# Patient Record
Sex: FEMALE | Race: OTHER | HISPANIC OR LATINO | Employment: OTHER | URBAN - METROPOLITAN AREA
[De-identification: names, ages, dates, MRNs, and addresses within clinical notes are randomized per-mention and may not be internally consistent; named-entity substitution may affect disease eponyms.]

---

## 2022-03-14 NOTE — PROGRESS NOTES
"Subjective:       Patient ID: Rajendra Blackmon is a 46 y.o. female.    Chief Complaint: Establish Care    Umberto - Romanian Translation Line Used     Rajendra Blackmon is a 46 y.o. female who presents today to establish care.     Reports weight gain, depression, and fatigue since having implant placed by [facility] (daughters of luiz). Is interested in removal.   Recently  (x 1 year) did educate on other options for contraceptive. Will refer to OBGYN.     Reports intermittently having nausea, dizziness, feeling "faint" like she is "not getting enough oxygen".     Reports "allergies" that causes rashes. She is a  and feels things like "dust" contribute.     Past Medical History:   Diagnosis Date    Allergy      History reviewed. No pertinent surgical history.  Social History     Socioeconomic History    Marital status:    Tobacco Use    Smoking status: Never Smoker    Smokeless tobacco: Never Used   Substance and Sexual Activity    Alcohol use: Never    Drug use: Never    Sexual activity: Yes     Partners: Male     Birth control/protection: Implant     Family History   Problem Relation Age of Onset    Cancer Sister         uterine    Cancer Brother         skin         Health Maintenance    MM2020?  PAP: ?  Colonoscopy: Declined   Hepatitis C Screen: Declined  HIV Screen: Declined       How would you described your general health: Good    Patient ambulates on her own without an assistive device.     On average, how many days per week do you do moderate to strenuous exercise:  (like a brisk walk or jog; this does not include your job/work)  1-3     On average, how many minutes do you exercise at this level each day? 30    Do you eat fruits and vegetable every day?  Often, but not daily    Do you have any questions or concerns about your eating habits?  No    Do you have any concerns in regards to access to food? No    Have you ever been a victim of threats, physical hurting, or forced " "sexual contact?  No    Does you partner control where you go or make you feel afraid? No    Have you ever had a partner who physically hurt or threatened you? No    Have you ever used tobacco products? No    Have you ever been screened for HIV? No  Would you like to be screened for HIV? No    If female and sex with a male partner, do either of you use protection against pregnancy?  Yes    If yes, what kind of protection    Other  Do you still have a menstrual cycle? No       If not,  contraception that interferes with cycles    Do you go to the dentist regularly? Yes  When was you last exam:     Do you go to the eye doctor regularly? No  When was your last exam:    Do you have any personal or family history of breast/ovarian/colon cancer?  Yes       If yes, who?    Do you have any personal or family history of heart disease? No       If yes, who?    Have you often been bothered by feeling down, depressed, or hopeless?  nearly every day    Have you often been bothered by having little interest or pleasure in doing things?  nearly every day    How often do you drink alcohol?  Never    How often have you used Marijuana or other illicit drugs in the past year?  never    How often have you used prescribed controlled substances in the past year?  never      Review of patient's allergies indicates:  No Known Allergies    Review of Systems   Constitutional: Positive for malaise/fatigue.   Respiratory: Positive for shortness of breath.    Cardiovascular: Negative for chest pain.   Gastrointestinal: Positive for abdominal pain and nausea. Negative for constipation, diarrhea and vomiting.   Skin: Positive for rash.   Neurological: Positive for dizziness.   Psychiatric/Behavioral: Positive for depression. The patient is nervous/anxious and has insomnia.        Objective:   /80 (BP Location: Right arm, Patient Position: Sitting, BP Method: Medium (Manual))   Pulse 62   Temp 97 °F (36.1 °C) (Temporal)   Ht 5' 1" (1.549 " m)   Wt 64.2 kg (141 lb 8.6 oz)   SpO2 98%   BMI 26.74 kg/m²     Physical Exam  Vitals reviewed.   Constitutional:       Appearance: Normal appearance.   HENT:      Head: Normocephalic and atraumatic.   Cardiovascular:      Rate and Rhythm: Normal rate and regular rhythm.      Heart sounds: Normal heart sounds. No murmur heard.  Pulmonary:      Effort: Pulmonary effort is normal.      Breath sounds: Normal breath sounds. No wheezing.   Abdominal:      General: Bowel sounds are normal.      Palpations: Abdomen is soft.      Tenderness: There is abdominal tenderness in the epigastric area.   Skin:     General: Skin is warm and dry.      Findings: Rash present. Rash is urticarial (to left AC).   Neurological:      Mental Status: She is alert and oriented to person, place, and time.       Assessment and Plan:       1. Encounter for annual physical exam    - CBC Auto Differential; Future  - Comprehensive Metabolic Panel; Future  - Lipid Panel; Future  - TSH; Future  - Ambulatory referral/consult to Obstetrics / Gynecology; Future  - SCHEDULED EKG 12-LEAD (to Muse); Future    2. Epigastric pain    New, work up, ? Medication     - H. PYLORI ANTIBODY, IGG; Future    3. Allergy, initial encounter    Chronic/recurrent, not stable, trial medications     - triamcinolone acetonide 0.1% (KENALOG) 0.1 % ointment; Apply topically 2 (two) times daily.  Dispense: 30 g; Refill: 0  - montelukast (SINGULAIR) 10 mg tablet; Take 1 tablet (10 mg total) by mouth every evening.  Dispense: 30 tablet; Refill: 1    4. Encounter for screening mammogram for malignant neoplasm of breast    - Mammo Digital Screening Bilat w/ Jacob; Future    5. Encounter for removal of subdermal contraceptive implant    - Ambulatory referral/consult to Obstetrics / Gynecology; Future    Labs Today - RTC in 1 week

## 2022-03-16 ENCOUNTER — OFFICE VISIT (OUTPATIENT)
Dept: INTERNAL MEDICINE | Facility: CLINIC | Age: 47
End: 2022-03-16
Payer: COMMERCIAL

## 2022-03-16 ENCOUNTER — LAB VISIT (OUTPATIENT)
Dept: LAB | Facility: HOSPITAL | Age: 47
End: 2022-03-16
Attending: NURSE PRACTITIONER
Payer: COMMERCIAL

## 2022-03-16 VITALS
BODY MASS INDEX: 26.73 KG/M2 | HEART RATE: 62 BPM | DIASTOLIC BLOOD PRESSURE: 80 MMHG | TEMPERATURE: 97 F | WEIGHT: 141.56 LBS | OXYGEN SATURATION: 98 % | SYSTOLIC BLOOD PRESSURE: 100 MMHG | HEIGHT: 61 IN

## 2022-03-16 DIAGNOSIS — Z00.00 ENCOUNTER FOR ANNUAL PHYSICAL EXAM: Primary | ICD-10-CM

## 2022-03-16 DIAGNOSIS — Z12.31 ENCOUNTER FOR SCREENING MAMMOGRAM FOR MALIGNANT NEOPLASM OF BREAST: ICD-10-CM

## 2022-03-16 DIAGNOSIS — R10.13 EPIGASTRIC PAIN: ICD-10-CM

## 2022-03-16 DIAGNOSIS — Z30.46 ENCOUNTER FOR REMOVAL OF SUBDERMAL CONTRACEPTIVE IMPLANT: ICD-10-CM

## 2022-03-16 DIAGNOSIS — Z00.00 ENCOUNTER FOR ANNUAL PHYSICAL EXAM: ICD-10-CM

## 2022-03-16 DIAGNOSIS — T78.40XA ALLERGY, INITIAL ENCOUNTER: ICD-10-CM

## 2022-03-16 LAB
ALBUMIN SERPL BCP-MCNC: 4.2 G/DL (ref 3.5–5.2)
ALP SERPL-CCNC: 93 U/L (ref 55–135)
ALT SERPL W/O P-5'-P-CCNC: 25 U/L (ref 10–44)
ANION GAP SERPL CALC-SCNC: 12 MMOL/L (ref 8–16)
AST SERPL-CCNC: 21 U/L (ref 10–40)
BASOPHILS # BLD AUTO: 0.05 K/UL (ref 0–0.2)
BASOPHILS NFR BLD: 0.8 % (ref 0–1.9)
BILIRUB SERPL-MCNC: 0.3 MG/DL (ref 0.1–1)
BUN SERPL-MCNC: 13 MG/DL (ref 6–20)
CALCIUM SERPL-MCNC: 10 MG/DL (ref 8.7–10.5)
CHLORIDE SERPL-SCNC: 107 MMOL/L (ref 95–110)
CHOLEST SERPL-MCNC: 194 MG/DL (ref 120–199)
CHOLEST/HDLC SERPL: 5.4 {RATIO} (ref 2–5)
CO2 SERPL-SCNC: 21 MMOL/L (ref 23–29)
CREAT SERPL-MCNC: 0.8 MG/DL (ref 0.5–1.4)
DIFFERENTIAL METHOD: ABNORMAL
EOSINOPHIL # BLD AUTO: 0.6 K/UL (ref 0–0.5)
EOSINOPHIL NFR BLD: 9.5 % (ref 0–8)
ERYTHROCYTE [DISTWIDTH] IN BLOOD BY AUTOMATED COUNT: 12.4 % (ref 11.5–14.5)
EST. GFR  (AFRICAN AMERICAN): >60 ML/MIN/1.73 M^2
EST. GFR  (NON AFRICAN AMERICAN): >60 ML/MIN/1.73 M^2
GLUCOSE SERPL-MCNC: 90 MG/DL (ref 70–110)
HCT VFR BLD AUTO: 41.6 % (ref 37–48.5)
HDLC SERPL-MCNC: 36 MG/DL (ref 40–75)
HDLC SERPL: 18.6 % (ref 20–50)
HGB BLD-MCNC: 13.9 G/DL (ref 12–16)
IMM GRANULOCYTES # BLD AUTO: 0.01 K/UL (ref 0–0.04)
IMM GRANULOCYTES NFR BLD AUTO: 0.2 % (ref 0–0.5)
LDLC SERPL CALC-MCNC: 105.2 MG/DL (ref 63–159)
LYMPHOCYTES # BLD AUTO: 2.3 K/UL (ref 1–4.8)
LYMPHOCYTES NFR BLD: 36.3 % (ref 18–48)
MCH RBC QN AUTO: 29 PG (ref 27–31)
MCHC RBC AUTO-ENTMCNC: 33.4 G/DL (ref 32–36)
MCV RBC AUTO: 87 FL (ref 82–98)
MONOCYTES # BLD AUTO: 0.3 K/UL (ref 0.3–1)
MONOCYTES NFR BLD: 5.1 % (ref 4–15)
NEUTROPHILS # BLD AUTO: 3.1 K/UL (ref 1.8–7.7)
NEUTROPHILS NFR BLD: 48.1 % (ref 38–73)
NONHDLC SERPL-MCNC: 158 MG/DL
NRBC BLD-RTO: 0 /100 WBC
PLATELET # BLD AUTO: 291 K/UL (ref 150–450)
PMV BLD AUTO: 9.5 FL (ref 9.2–12.9)
POTASSIUM SERPL-SCNC: 4 MMOL/L (ref 3.5–5.1)
PROT SERPL-MCNC: 8.1 G/DL (ref 6–8.4)
RBC # BLD AUTO: 4.79 M/UL (ref 4–5.4)
SODIUM SERPL-SCNC: 140 MMOL/L (ref 136–145)
TRIGL SERPL-MCNC: 264 MG/DL (ref 30–150)
TSH SERPL DL<=0.005 MIU/L-ACNC: 2.31 UIU/ML (ref 0.4–4)
WBC # BLD AUTO: 6.42 K/UL (ref 3.9–12.7)

## 2022-03-16 PROCEDURE — 3008F PR BODY MASS INDEX (BMI) DOCUMENTED: ICD-10-PCS | Mod: CPTII,S$GLB,, | Performed by: NURSE PRACTITIONER

## 2022-03-16 PROCEDURE — 99386 PR PREVENTIVE VISIT,NEW,40-64: ICD-10-PCS | Mod: S$GLB,,, | Performed by: NURSE PRACTITIONER

## 2022-03-16 PROCEDURE — 84443 ASSAY THYROID STIM HORMONE: CPT | Performed by: NURSE PRACTITIONER

## 2022-03-16 PROCEDURE — 80053 COMPREHEN METABOLIC PANEL: CPT | Performed by: NURSE PRACTITIONER

## 2022-03-16 PROCEDURE — 3074F SYST BP LT 130 MM HG: CPT | Mod: CPTII,S$GLB,, | Performed by: NURSE PRACTITIONER

## 2022-03-16 PROCEDURE — 86677 HELICOBACTER PYLORI ANTIBODY: CPT | Performed by: NURSE PRACTITIONER

## 2022-03-16 PROCEDURE — 99999 PR PBB SHADOW E&M-NEW PATIENT-LVL IV: ICD-10-PCS | Mod: PBBFAC,,, | Performed by: NURSE PRACTITIONER

## 2022-03-16 PROCEDURE — 80061 LIPID PANEL: CPT | Performed by: NURSE PRACTITIONER

## 2022-03-16 PROCEDURE — 3074F PR MOST RECENT SYSTOLIC BLOOD PRESSURE < 130 MM HG: ICD-10-PCS | Mod: CPTII,S$GLB,, | Performed by: NURSE PRACTITIONER

## 2022-03-16 PROCEDURE — 3079F DIAST BP 80-89 MM HG: CPT | Mod: CPTII,S$GLB,, | Performed by: NURSE PRACTITIONER

## 2022-03-16 PROCEDURE — 36415 COLL VENOUS BLD VENIPUNCTURE: CPT | Mod: PO | Performed by: NURSE PRACTITIONER

## 2022-03-16 PROCEDURE — 99999 PR PBB SHADOW E&M-NEW PATIENT-LVL IV: CPT | Mod: PBBFAC,,, | Performed by: NURSE PRACTITIONER

## 2022-03-16 PROCEDURE — 3008F BODY MASS INDEX DOCD: CPT | Mod: CPTII,S$GLB,, | Performed by: NURSE PRACTITIONER

## 2022-03-16 PROCEDURE — 99386 PREV VISIT NEW AGE 40-64: CPT | Mod: S$GLB,,, | Performed by: NURSE PRACTITIONER

## 2022-03-16 PROCEDURE — 3079F PR MOST RECENT DIASTOLIC BLOOD PRESSURE 80-89 MM HG: ICD-10-PCS | Mod: CPTII,S$GLB,, | Performed by: NURSE PRACTITIONER

## 2022-03-16 PROCEDURE — 85025 COMPLETE CBC W/AUTO DIFF WBC: CPT | Performed by: NURSE PRACTITIONER

## 2022-03-16 RX ORDER — TRIAMCINOLONE ACETONIDE 1 MG/G
OINTMENT TOPICAL 2 TIMES DAILY
Qty: 30 G | Refills: 0 | Status: SHIPPED | OUTPATIENT
Start: 2022-03-16 | End: 2022-05-24

## 2022-03-16 RX ORDER — MONTELUKAST SODIUM 10 MG/1
TABLET ORAL
Qty: 90 TABLET | OUTPATIENT
Start: 2022-03-16

## 2022-03-16 RX ORDER — MONTELUKAST SODIUM 10 MG/1
10 TABLET ORAL NIGHTLY
Qty: 30 TABLET | Refills: 1 | Status: SHIPPED | OUTPATIENT
Start: 2022-03-16 | End: 2022-04-15

## 2022-03-17 NOTE — TELEPHONE ENCOUNTER
----- Message from Marialuisa Jenkins NP sent at 3/17/2022  8:23 AM CDT -----  Please call patient and let her know that I have reviewed her labs and will discuss them further with her at her appointment but all is well.     DZ

## 2022-03-21 LAB — H PYLORI IGG SERPL QL IA: ABNORMAL

## 2022-05-24 ENCOUNTER — OFFICE VISIT (OUTPATIENT)
Dept: INTERNAL MEDICINE | Facility: CLINIC | Age: 47
End: 2022-05-24
Payer: COMMERCIAL

## 2022-05-24 VITALS
DIASTOLIC BLOOD PRESSURE: 72 MMHG | BODY MASS INDEX: 26.68 KG/M2 | RESPIRATION RATE: 18 BRPM | WEIGHT: 141.31 LBS | TEMPERATURE: 98 F | SYSTOLIC BLOOD PRESSURE: 104 MMHG | OXYGEN SATURATION: 98 % | HEIGHT: 61 IN | HEART RATE: 66 BPM

## 2022-05-24 DIAGNOSIS — Z30.09 ENCOUNTER FOR COUNSELING REGARDING CONTRACEPTION: Primary | ICD-10-CM

## 2022-05-24 DIAGNOSIS — Z30.46 ENCOUNTER FOR REMOVAL OF SUBDERMAL CONTRACEPTIVE IMPLANT: ICD-10-CM

## 2022-05-24 PROCEDURE — 3078F DIAST BP <80 MM HG: CPT | Mod: CPTII,S$GLB,, | Performed by: NURSE PRACTITIONER

## 2022-05-24 PROCEDURE — 99213 OFFICE O/P EST LOW 20 MIN: CPT | Mod: S$GLB,,, | Performed by: NURSE PRACTITIONER

## 2022-05-24 PROCEDURE — 3008F BODY MASS INDEX DOCD: CPT | Mod: CPTII,S$GLB,, | Performed by: NURSE PRACTITIONER

## 2022-05-24 PROCEDURE — 1160F RVW MEDS BY RX/DR IN RCRD: CPT | Mod: CPTII,S$GLB,, | Performed by: NURSE PRACTITIONER

## 2022-05-24 PROCEDURE — 99999 PR PBB SHADOW E&M-EST. PATIENT-LVL IV: ICD-10-PCS | Mod: PBBFAC,,, | Performed by: NURSE PRACTITIONER

## 2022-05-24 PROCEDURE — 3074F SYST BP LT 130 MM HG: CPT | Mod: CPTII,S$GLB,, | Performed by: NURSE PRACTITIONER

## 2022-05-24 PROCEDURE — 3074F PR MOST RECENT SYSTOLIC BLOOD PRESSURE < 130 MM HG: ICD-10-PCS | Mod: CPTII,S$GLB,, | Performed by: NURSE PRACTITIONER

## 2022-05-24 PROCEDURE — 3008F PR BODY MASS INDEX (BMI) DOCUMENTED: ICD-10-PCS | Mod: CPTII,S$GLB,, | Performed by: NURSE PRACTITIONER

## 2022-05-24 PROCEDURE — 1159F PR MEDICATION LIST DOCUMENTED IN MEDICAL RECORD: ICD-10-PCS | Mod: CPTII,S$GLB,, | Performed by: NURSE PRACTITIONER

## 2022-05-24 PROCEDURE — 1160F PR REVIEW ALL MEDS BY PRESCRIBER/CLIN PHARMACIST DOCUMENTED: ICD-10-PCS | Mod: CPTII,S$GLB,, | Performed by: NURSE PRACTITIONER

## 2022-05-24 PROCEDURE — 3078F PR MOST RECENT DIASTOLIC BLOOD PRESSURE < 80 MM HG: ICD-10-PCS | Mod: CPTII,S$GLB,, | Performed by: NURSE PRACTITIONER

## 2022-05-24 PROCEDURE — 99999 PR PBB SHADOW E&M-EST. PATIENT-LVL IV: CPT | Mod: PBBFAC,,, | Performed by: NURSE PRACTITIONER

## 2022-05-24 PROCEDURE — 1159F MED LIST DOCD IN RCRD: CPT | Mod: CPTII,S$GLB,, | Performed by: NURSE PRACTITIONER

## 2022-05-24 PROCEDURE — 99213 PR OFFICE/OUTPT VISIT, EST, LEVL III, 20-29 MIN: ICD-10-PCS | Mod: S$GLB,,, | Performed by: NURSE PRACTITIONER

## 2022-05-24 NOTE — PROGRESS NOTES
"Subjective:       Patient ID: Rajendra Blackmon is a 46 y.o. female.    Chief Complaint: Follow-up (Referral for Mammogram ), Referral (OB/GYN), and Establish Care    Burmese Translation Line Used: [id number]    Patient is a 46 y.o. female who traditionally follows with Primary Doctor No presenting today for follow up.    Patient reports Singulair has helped some with her rash but it has not resolved -- she knows it will not until she changes jobs due to allergy exposures. States abdominal pain has resolved.     Missed GYN appointment -- needs to reschedule to have implant removed. Need to have MMG rescheduled.     Review of patient's allergies indicates:  No Known Allergies    Medication List with Changes/Refills   Discontinued Medications    TRIAMCINOLONE ACETONIDE 0.1% (KENALOG) 0.1 % OINTMENT    Apply topically 2 (two) times daily.     Health Maintenance     MMG:   PAP:   Colonoscopy: Declined   Hepatitis C Screen: Declined  HIV Screen: Declined     Medical, social and surgical history has been reviewed with the patient.      Review of Systems   Constitutional: Negative for chills and fever.   Respiratory: Negative for cough and shortness of breath.    Cardiovascular: Negative for chest pain.   Neurological: Negative for dizziness and headaches.       Objective:   /72 (BP Location: Right arm, Patient Position: Sitting, BP Method: Medium (Manual))   Pulse 66   Temp 97.7 °F (36.5 °C) (Temporal)   Resp 18   Ht 5' 1" (1.549 m)   Wt 64.1 kg (141 lb 5 oz)   SpO2 98%   BMI 26.70 kg/m²     Physical Exam  Vitals reviewed.   Constitutional:       Appearance: Normal appearance.   HENT:      Head: Normocephalic and atraumatic.   Cardiovascular:      Rate and Rhythm: Normal rate and regular rhythm.      Heart sounds: Normal heart sounds. No murmur heard.  Pulmonary:      Effort: Pulmonary effort is normal.      Breath sounds: Normal breath sounds. No wheezing.   Skin:     General: Skin is warm and dry.   Neurological:     "  Mental Status: She is alert and oriented to person, place, and time.       Last Labs:  Glucose   Date Value Ref Range Status   03/16/2022 90 70 - 110 mg/dL Final     BUN   Date Value Ref Range Status   03/16/2022 13 6 - 20 mg/dL Final     Creatinine   Date Value Ref Range Status   03/16/2022 0.8 0.5 - 1.4 mg/dL Final     Potassium   Date Value Ref Range Status   03/16/2022 4.0 3.5 - 5.1 mmol/L Final     Cholesterol   Date Value Ref Range Status   03/16/2022 194 120 - 199 mg/dL Final     Comment:     The National Cholesterol Education Program (NCEP) has set the  following guidelines (reference ranges) for Cholesterol:  Optimal.....................<200 mg/dL  Borderline High.............200-239 mg/dL  High........................> or = 240 mg/dL       Hemoglobin   Date Value Ref Range Status   03/16/2022 13.9 12.0 - 16.0 g/dL Final     Hematocrit   Date Value Ref Range Status   03/16/2022 41.6 37.0 - 48.5 % Final     I have reviewed the following:     Details / Date    [x]   Labs     []   Micro     []   Pathology     []   Imaging     []   Cardiology Procedures     []   Other      Assessment and Plan:     1. Encounter for counseling regarding contraception  2. Encounter for removal of subdermal contraceptive implant    - Ambulatory referral/consult to Obstetrics / Gynecology; Future  - After discussion of contraceptive options she is interested in Phexxi

## 2022-06-03 ENCOUNTER — HOSPITAL ENCOUNTER (OUTPATIENT)
Dept: RADIOLOGY | Facility: HOSPITAL | Age: 47
Discharge: HOME | End: 2022-06-03
Attending: NURSE PRACTITIONER
Payer: COMMERCIAL

## 2022-06-03 VITALS — BODY MASS INDEX: 26.62 KG/M2 | WEIGHT: 141 LBS | HEIGHT: 61 IN

## 2022-06-03 DIAGNOSIS — Z12.31 ENCOUNTER FOR SCREENING MAMMOGRAM FOR MALIGNANT NEOPLASM OF BREAST: ICD-10-CM

## 2022-06-03 PROCEDURE — 77067 SCR MAMMO BI INCL CAD: CPT | Mod: TC,PO

## 2022-06-03 PROCEDURE — 77067 SCR MAMMO BI INCL CAD: CPT | Mod: 26,,, | Performed by: RADIOLOGY

## 2022-06-03 PROCEDURE — 77063 BREAST TOMOSYNTHESIS BI: CPT | Mod: 26,,, | Performed by: RADIOLOGY

## 2022-06-03 PROCEDURE — 77063 MAMMO DIGITAL SCREENING BILAT WITH TOMO: ICD-10-PCS | Mod: 26,,, | Performed by: RADIOLOGY

## 2022-06-03 PROCEDURE — 77067 MAMMO DIGITAL SCREENING BILAT WITH TOMO: ICD-10-PCS | Mod: 26,,, | Performed by: RADIOLOGY

## 2022-06-07 ENCOUNTER — TELEPHONE (OUTPATIENT)
Dept: RADIOLOGY | Facility: HOSPITAL | Age: 47
End: 2022-06-07
Payer: COMMERCIAL

## 2022-06-07 NOTE — TELEPHONE ENCOUNTER
Spoke with patient and explained mammogram findings.Patient expressed understanding of results. Patient scheduled abnormal mammogram follow up appointment at [facility] [place] on 6/14/2022.

## 2022-06-20 ENCOUNTER — TELEPHONE (OUTPATIENT)
Dept: RADIOLOGY | Facility: HOSPITAL | Age: 47
End: 2022-06-20
Payer: COMMERCIAL

## 2022-06-20 NOTE — TELEPHONE ENCOUNTER
Patient was scheduled for a abnormal mammogram follow up on 6/14/2022 and the patient did not show for her follow up, called patient and rescheduled her follow up to 6/24/202 at the breast center.

## 2022-06-21 ENCOUNTER — OFFICE VISIT (OUTPATIENT)
Dept: OBSTETRICS AND GYNECOLOGY | Facility: CLINIC | Age: 47
End: 2022-06-21
Attending: OBSTETRICS & GYNECOLOGY
Payer: COMMERCIAL

## 2022-06-21 VITALS — HEIGHT: 61 IN | BODY MASS INDEX: 26.43 KG/M2 | WEIGHT: 140 LBS

## 2022-06-21 DIAGNOSIS — Z30.46 ENCOUNTER FOR REMOVAL OF SUBDERMAL CONTRACEPTIVE IMPLANT: ICD-10-CM

## 2022-06-21 DIAGNOSIS — Z01.419 WELL WOMAN EXAM: Primary | ICD-10-CM

## 2022-06-21 DIAGNOSIS — Z30.09 ENCOUNTER FOR COUNSELING REGARDING CONTRACEPTION: ICD-10-CM

## 2022-06-21 PROCEDURE — 99386 PR PREVENTIVE VISIT,NEW,40-64: ICD-10-PCS | Mod: 25,S$GLB,, | Performed by: OBSTETRICS & GYNECOLOGY

## 2022-06-21 PROCEDURE — 3008F BODY MASS INDEX DOCD: CPT | Mod: CPTII,S$GLB,, | Performed by: OBSTETRICS & GYNECOLOGY

## 2022-06-21 PROCEDURE — 99999 PR PBB SHADOW E&M-EST. PATIENT-LVL III: ICD-10-PCS | Mod: PBBFAC,,, | Performed by: OBSTETRICS & GYNECOLOGY

## 2022-06-21 PROCEDURE — 88175 CYTOPATH C/V AUTO FLUID REDO: CPT | Performed by: STUDENT IN AN ORGANIZED HEALTH CARE EDUCATION/TRAINING PROGRAM

## 2022-06-21 PROCEDURE — 87624 HPV HI-RISK TYP POOLED RSLT: CPT | Performed by: STUDENT IN AN ORGANIZED HEALTH CARE EDUCATION/TRAINING PROGRAM

## 2022-06-21 PROCEDURE — 3008F PR BODY MASS INDEX (BMI) DOCUMENTED: ICD-10-PCS | Mod: CPTII,S$GLB,, | Performed by: OBSTETRICS & GYNECOLOGY

## 2022-06-21 PROCEDURE — 11982 REMOVE DRUG IMPLANT DEVICE: CPT | Mod: S$GLB,,, | Performed by: STUDENT IN AN ORGANIZED HEALTH CARE EDUCATION/TRAINING PROGRAM

## 2022-06-21 PROCEDURE — 87625 HPV TYPES 16 & 18 ONLY: CPT | Mod: 59 | Performed by: STUDENT IN AN ORGANIZED HEALTH CARE EDUCATION/TRAINING PROGRAM

## 2022-06-21 PROCEDURE — 99999 PR PBB SHADOW E&M-EST. PATIENT-LVL III: CPT | Mod: PBBFAC,,, | Performed by: OBSTETRICS & GYNECOLOGY

## 2022-06-21 PROCEDURE — 99386 PREV VISIT NEW AGE 40-64: CPT | Mod: 25,S$GLB,, | Performed by: OBSTETRICS & GYNECOLOGY

## 2022-06-21 PROCEDURE — 11982 REMOVAL OF NEXPLANON DEVICE: ICD-10-PCS | Mod: S$GLB,,, | Performed by: STUDENT IN AN ORGANIZED HEALTH CARE EDUCATION/TRAINING PROGRAM

## 2022-06-21 RX ORDER — CETIRIZINE HYDROCHLORIDE 10 MG/1
10 TABLET ORAL
COMMUNITY

## 2022-06-21 RX ORDER — HYDROCODONE BITARTRATE AND ACETAMINOPHEN 5; 325 MG/1; MG/1
TABLET ORAL
COMMUNITY
Start: 2022-05-29

## 2022-06-21 RX ORDER — CYCLOBENZAPRINE HCL 10 MG
TABLET ORAL
COMMUNITY
Start: 2022-05-29

## 2022-06-21 RX ORDER — NORELGESTROMIN AND ETHINYL ESTRADIOL 35; 150 UG/MG; UG/MG
1 PATCH TRANSDERMAL
Qty: 3 PATCH | Refills: 12 | Status: SHIPPED | OUTPATIENT
Start: 2022-06-21 | End: 2023-06-20

## 2022-06-21 RX ORDER — IBUPROFEN 800 MG/1
TABLET ORAL
COMMUNITY
Start: 2022-05-29

## 2022-06-21 NOTE — PROGRESS NOTES
"Past medical, surgical, social, family, and obstetric histories; medications; prior records and results; and available outside records were reviewed and updated in the EMR.  Pertinent findings were noted below.    Reason for Visit   Procedure (Nexplanon removal only)    HPI   46 y.o. female     New patient: Yes    Menopausal: No    History of abnormal paps: DENIES  Abnormal or postmenopausal bleeding: DENIES  History of abnormal mammograms:DENIES   Family history of breast or ovarian cancer: DENIES  Any breast masses, pain, skin changes, or nipple discharge: DENIES  Possible recent STD exposure: denies  Contraception: Nexplanon, however desires removal     Would like to discuss contraception today. She had the nexplanon placed a year ago but would like it removed due to feelings of depression and weight gain. Denies suicidal ideation. She is amenorrheic on the nexplanon    Pap: 2022, No recent documented pap  Mammogram: No recent documented mammogram  Allergies: Patient has no known allergies.    Review of Systems   Constitutional: Negative for chills and fatigue.   Eyes: Negative for visual disturbance.   Respiratory: Negative for cough and shortness of breath.    Cardiovascular: Negative for chest pain.   Gastrointestinal: Negative for abdominal pain, nausea and vomiting.   Endocrine: Negative for diabetes.   Genitourinary: Negative for dysuria and vaginal bleeding.   Musculoskeletal: Negative for back pain.   Neurological: Negative for headaches.       Exam   Ht 5' 1" (1.549 m)   Wt 63.5 kg (139 lb 15.9 oz)   BMI 26.45 kg/m²     Physical Exam  Constitutional:       General: She is not in acute distress.     Appearance: Normal appearance. She is well-developed.   Genitourinary:      Vulva normal.      Right Labia: No rash, lesions or Bartholin's cyst.     Left Labia: No lesions, Bartholin's cyst or rash.     No vaginal discharge or bleeding.        Right Adnexa: not tender and not full.     Left " Adnexa: not tender and not full.     No cervical motion tenderness, discharge or lesion.      Uterus is not enlarged or tender.      Pelvic exam was performed with patient in the lithotomy position.   Breasts: Breasts are symmetrical.      Right: No mass, nipple discharge, skin change, tenderness or axillary adenopathy.      Left: No mass, nipple discharge, skin change, tenderness or axillary adenopathy.       Pulmonary:      Effort: No respiratory distress.   Lymphadenopathy:      Upper Body:      Right upper body: No axillary adenopathy.      Left upper body: No axillary adenopathy.   Exam conducted with a chaperone present.       Assessment and Plan   Well woman exam  -     Liquid-Based Pap Smear, Screening  -     HPV High Risk Genotypes, PCR    Encounter for removal of subdermal contraceptive implant  -     Ambulatory referral/consult to Obstetrics / Gynecology  -     Removal of Nexplanon Device    Encounter for counseling regarding contraception  -     norelgestromin-ethinyl estradiol (ORTHO EVRA) 150-35 mcg/24 hr; Place 1 patch onto the skin every 7 days. Apply 1 patch per week for 3 weeks, then none for 1 week.  Dispense: 3 patch; Refill: 12    Other orders  -     Cancel: Insertion of Nexplanon       Annual exam  o Breast and pelvic exam: wnl  o Patient was counseled on ASCCP guidelines for cervical cytology screening  o Cervical screening: pap obtained today  o Patient was counseled on current recommendations for breast cancer screening  o Mammogram screening: mammogram scheduled   STD testing: declines  Contraception: Contraception counseling:  - The use of hormonal contraception has been fully discussed with the patient. We discussed all options including OCPs, transdermal patches, vaginal ring, DepoProvera injections, Nexplanon, and IUDs.  - Effectiveness, compliance issues, initiation, and bleeding profile of each method  - Warnings about anticipated minor side effects such as breakthrough spotting,  nausea, breast tenderness, weight changes, acne, headaches, etc  - More serious potential side effects (especially with combined hormonal contraceptive methods) such as MI, stroke, and deep vein thrombosis were discussed, all of which are very unlikely.  - Instructed to report any signs of such serious problems immediately.  - The need for additional barrier protection, such as a condom, to prevent exposure to sexually transmitted diseases has also been discussed. The patient has been clearly reminded that no hormonal contraceptive method can protect her against diseases such as HIV and others.  - She understands and agrees to take the medication as prescribed. She wishes to begin Patch. She has tried OCPs, depo-provera, IUD, and nexplanon and did not like the side effects.    Nexplanon was removed today, see procedure note   Instructed her to follow up if her mood does not improve with removal of nexplanon. Discussed therapy as well as SSRI and patient declined at this time    She was counseled to follow up with her PCP for other routine health maintenance    Stefani Grewal MD PGY-2  Obstetrics and Gynecology

## 2022-06-21 NOTE — PROGRESS NOTES
"Past medical, surgical, social, family, and obstetric histories; medications; prior records and results; and available outside records were reviewed and updated in the EMR.  Pertinent findings were noted below.    Reason for Visit   Procedure (Nexplanon removal only)    HPI   46 y.o. female     New patient: Yes    Menopausal: No    History of abnormal paps: DENIES  Abnormal or postmenopausal bleeding: Amenorrheic on nexplanon  History of abnormal mammograms:DENIES   Family history of breast or ovarian cancer: DENIES  Any breast masses, pain, skin changes, or nipple discharge: DENIES  Possible recent STD exposure: denies  Contraception: Nexplanon, however desires removal     Patient desires removal of nexplanon. She reports symptoms    Pap: No result found, No recent documented pap  Mammogram: scheduled  Allergies: Patient has no known allergies.    Review of Systems   Constitutional: Negative for chills and fatigue.   Eyes: Negative for visual disturbance.   Respiratory: Negative for cough and shortness of breath.    Cardiovascular: Negative for chest pain.   Gastrointestinal: Negative for abdominal pain, nausea and vomiting.   Endocrine: Negative for diabetes.   Genitourinary: Negative for dysuria and vaginal bleeding.   Musculoskeletal: Negative for back pain.   Neurological: Negative for headaches.       Exam   Ht 5' 1" (1.549 m)   Wt 63.5 kg (139 lb 15.9 oz)   BMI 26.45 kg/m²     Physical Exam  Constitutional:       General: She is not in acute distress.     Appearance: Normal appearance. She is well-developed.   Genitourinary:      Vulva normal.      Right Labia: No rash, lesions or Bartholin's cyst.     Left Labia: No lesions, Bartholin's cyst or rash.     No vaginal discharge or bleeding.        Right Adnexa: not tender and not full.     Left Adnexa: not tender and not full.     No cervical motion tenderness, discharge or lesion.      Uterus is not enlarged or tender.      Pelvic exam was performed " with patient in the lithotomy position.   Breasts: Breasts are symmetrical.      Right: No mass, nipple discharge, skin change, tenderness or axillary adenopathy.      Left: No mass, nipple discharge, skin change, tenderness or axillary adenopathy.       Pulmonary:      Effort: No respiratory distress.   Lymphadenopathy:      Upper Body:      Right upper body: No axillary adenopathy.      Left upper body: No axillary adenopathy.   Exam conducted with a chaperone present.       Assessment and Plan   Encounter for removal of subdermal contraceptive implant  -     Ambulatory referral/consult to Obstetrics / Gynecology       Annual exam  o Breast and pelvic exam: wnl  o Patient was counseled on ASCCP guidelines for cervical cytology screening  o Cervical screening: pap obtained today  o Patient was counseled on current recommendations for breast cancer screening  o Mammogram screening: scheuduled   STD testing: ***   Contraception: ***   ***    She was counseled to follow up with her PCP for other routine health maintenance

## 2022-06-21 NOTE — PROCEDURES
Removal of Nexplanon Device    Date/Time: 6/21/2022 9:30 AM  Performed by: Stefani Grewal MD  Authorized by: Stefani Grewal MD   Preparation: Patient was prepped and draped in the usual sterile fashion.  Local anesthesia used: yes  Anesthesia: local infiltration    Anesthesia:  Local anesthesia used: yes  Local Anesthetic: lidocaine 1% with epinephrine  Anesthetic total: 3 mL    Sedation:  Patient sedated: no    Patient tolerance: patient tolerated the procedure well with no immediate complications  Comments: Small incision was made distal to the nexplanon device with visualization of the nexplanon device which was grasped using a hemostat. The nexplanon device was removed fully. Hemostasis was noted. The area was bandaged using steri strips and gauze. She was instructed to leave the compressive dressing on for 24 hours.

## 2022-06-24 ENCOUNTER — TELEPHONE (OUTPATIENT)
Dept: RADIOLOGY | Facility: HOSPITAL | Age: 47
End: 2022-06-24
Payer: COMMERCIAL

## 2022-06-24 NOTE — TELEPHONE ENCOUNTER
Patient was scheduled for a follow up mammogram on 6/24/2022 and she did not show for her follow up, called patient. No answer, left message with my contact information.

## 2022-06-27 ENCOUNTER — TELEPHONE (OUTPATIENT)
Dept: RADIOLOGY | Facility: HOSPITAL | Age: 47
End: 2022-06-27
Payer: COMMERCIAL

## 2022-06-27 NOTE — TELEPHONE ENCOUNTER
Patient was scheduled for a follow up mammogram on 6/24/2022 and she did not show for her follow up, called patient. No answer, left message with my contact information

## 2022-06-28 LAB
CLINICAL INFO: NORMAL
CYTO CVX: NORMAL
CYTOLOGIST CVX/VAG CYTO: NORMAL
CYTOLOGIST CVX/VAG CYTO: NORMAL
CYTOLOGY CMNT CVX/VAG CYTO-IMP: NORMAL
CYTOLOGY PAP THIN PREP EXPLANATION: NORMAL
DATE OF PREVIOUS PAP: NORMAL
DATE PREVIOUS BX: NO
GEN CATEG CVX/VAG CYTO-IMP: NORMAL
HPV I/H RISK 4 DNA CVX QL NAA+PROBE: DETECTED
HPV16 DNA CVX QL PROBE+SIG AMP: NOT DETECTED
HPV18 DNA CVX QL PROBE+SIG AMP: NOT DETECTED
LMP START DATE: NORMAL
MICROORGANISM CVX/VAG CYTO: NORMAL
PATHOLOGIST CVX/VAG CYTO: NORMAL
SERVICE CMNT-IMP: NORMAL
SPECIMEN SOURCE CVX/VAG CYTO: NORMAL
STAT OF ADQ CVX/VAG CYTO-IMP: NORMAL

## 2022-07-06 ENCOUNTER — TELEPHONE (OUTPATIENT)
Dept: RADIOLOGY | Facility: HOSPITAL | Age: 47
End: 2022-07-06
Payer: COMMERCIAL

## 2022-07-06 NOTE — TELEPHONE ENCOUNTER
----- Message from Adry Beal sent at 7/6/2022  8:54 AM CDT -----  Contact: @ [phone]  Pt is returning a missed call please call and adv @ [phone]

## 2022-07-18 ENCOUNTER — HOSPITAL ENCOUNTER (OUTPATIENT)
Dept: RADIOLOGY | Facility: HOSPITAL | Age: 47
Discharge: HOME | End: 2022-07-18
Attending: NURSE PRACTITIONER
Payer: COMMERCIAL

## 2022-07-18 DIAGNOSIS — R92.8 ABNORMAL FINDING ON BREAST IMAGING: ICD-10-CM

## 2022-07-18 PROCEDURE — 76642 ULTRASOUND BREAST LIMITED: CPT | Mod: 26,RT,, | Performed by: RADIOLOGY

## 2022-07-18 PROCEDURE — 77065 DX MAMMO INCL CAD UNI: CPT | Mod: 26,RT,, | Performed by: RADIOLOGY

## 2022-07-18 PROCEDURE — 77065 MAMMO DIGITAL DIAGNOSTIC RIGHT WITH TOMO: ICD-10-PCS | Mod: 26,RT,, | Performed by: RADIOLOGY

## 2022-07-18 PROCEDURE — 77061 BREAST TOMOSYNTHESIS UNI: CPT | Mod: 26,RT,, | Performed by: RADIOLOGY

## 2022-07-18 PROCEDURE — 77065 DX MAMMO INCL CAD UNI: CPT | Mod: TC,RT

## 2022-07-18 PROCEDURE — 77061 MAMMO DIGITAL DIAGNOSTIC RIGHT WITH TOMO: ICD-10-PCS | Mod: 26,RT,, | Performed by: RADIOLOGY

## 2022-07-18 PROCEDURE — 76642 US BREAST RIGHT LIMITED: ICD-10-PCS | Mod: 26,RT,, | Performed by: RADIOLOGY

## 2022-07-18 PROCEDURE — 76642 ULTRASOUND BREAST LIMITED: CPT | Mod: TC,RT

## 2023-04-27 ENCOUNTER — OFFICE VISIT (OUTPATIENT)
Dept: PRIMARY CARE CLINIC | Facility: CLINIC | Age: 48
End: 2023-04-27
Payer: COMMERCIAL

## 2023-04-27 VITALS
BODY MASS INDEX: 27.24 KG/M2 | WEIGHT: 144.19 LBS | HEART RATE: 52 BPM | SYSTOLIC BLOOD PRESSURE: 108 MMHG | DIASTOLIC BLOOD PRESSURE: 64 MMHG | TEMPERATURE: 98 F | OXYGEN SATURATION: 98 %

## 2023-04-27 DIAGNOSIS — R09.81 SINUS CONGESTION: ICD-10-CM

## 2023-04-27 DIAGNOSIS — R51.9 NONINTRACTABLE HEADACHE, UNSPECIFIED CHRONICITY PATTERN, UNSPECIFIED HEADACHE TYPE: Primary | ICD-10-CM

## 2023-04-27 DIAGNOSIS — R05.9 COUGH, UNSPECIFIED TYPE: ICD-10-CM

## 2023-04-27 DIAGNOSIS — M79.10 MYALGIA: ICD-10-CM

## 2023-04-27 PROCEDURE — 1159F PR MEDICATION LIST DOCUMENTED IN MEDICAL RECORD: ICD-10-PCS | Mod: CPTII,S$GLB,, | Performed by: INTERNAL MEDICINE

## 2023-04-27 PROCEDURE — 3078F PR MOST RECENT DIASTOLIC BLOOD PRESSURE < 80 MM HG: ICD-10-PCS | Mod: CPTII,S$GLB,, | Performed by: INTERNAL MEDICINE

## 2023-04-27 PROCEDURE — 3074F SYST BP LT 130 MM HG: CPT | Mod: CPTII,S$GLB,, | Performed by: INTERNAL MEDICINE

## 2023-04-27 PROCEDURE — 3008F BODY MASS INDEX DOCD: CPT | Mod: CPTII,S$GLB,, | Performed by: INTERNAL MEDICINE

## 2023-04-27 PROCEDURE — 3008F PR BODY MASS INDEX (BMI) DOCUMENTED: ICD-10-PCS | Mod: CPTII,S$GLB,, | Performed by: INTERNAL MEDICINE

## 2023-04-27 PROCEDURE — 99214 PR OFFICE/OUTPT VISIT, EST, LEVL IV, 30-39 MIN: ICD-10-PCS | Mod: S$GLB,,, | Performed by: INTERNAL MEDICINE

## 2023-04-27 PROCEDURE — 99214 OFFICE O/P EST MOD 30 MIN: CPT | Mod: S$GLB,,, | Performed by: INTERNAL MEDICINE

## 2023-04-27 PROCEDURE — 99999 PR PBB SHADOW E&M-EST. PATIENT-LVL III: ICD-10-PCS | Mod: PBBFAC,,, | Performed by: INTERNAL MEDICINE

## 2023-04-27 PROCEDURE — 3078F DIAST BP <80 MM HG: CPT | Mod: CPTII,S$GLB,, | Performed by: INTERNAL MEDICINE

## 2023-04-27 PROCEDURE — 99999 PR PBB SHADOW E&M-EST. PATIENT-LVL III: CPT | Mod: PBBFAC,,, | Performed by: INTERNAL MEDICINE

## 2023-04-27 PROCEDURE — 3074F PR MOST RECENT SYSTOLIC BLOOD PRESSURE < 130 MM HG: ICD-10-PCS | Mod: CPTII,S$GLB,, | Performed by: INTERNAL MEDICINE

## 2023-04-27 PROCEDURE — 1159F MED LIST DOCD IN RCRD: CPT | Mod: CPTII,S$GLB,, | Performed by: INTERNAL MEDICINE

## 2023-04-27 RX ORDER — PROMETHAZINE HYDROCHLORIDE AND DEXTROMETHORPHAN HYDROBROMIDE 6.25; 15 MG/5ML; MG/5ML
5 SYRUP ORAL EVERY 4 HOURS PRN
Qty: 118 ML | Refills: 0 | Status: SHIPPED | OUTPATIENT
Start: 2023-04-27 | End: 2023-05-07

## 2023-04-27 NOTE — PROGRESS NOTES
[facility] Note    Chief Complaint      Chief Complaint   Patient presents with    Headache    Sore Throat    Cough    Nasal Congestion     History of Present Illness      Rajendra Blackmon is a 47 y.o. female who presents today for headache.  Patient comes to appointment alone.    Started with sore throat and headache on 4/24, felt better on 4/25.    SOB and wheezing since yesterday. Subj fever, body aches. Headache and sore throat are back. Lots of postnasal drip and sinus congestion.  Has rash in crease of elbow.  Has not taken COVID test.  No sick contacts.  Has not taken anything over the counter    Problem List Items Addressed This Visit    None  Visit Diagnoses       Nonintractable headache, unspecified chronicity pattern, unspecified headache type    -  Primary    Cough, unspecified type        Myalgia        Sinus congestion                Health Maintenance   Topic Date Due    Hepatitis C Screening  Never done    TETANUS VACCINE  Never done    Mammogram  07/18/2023    Lipid Panel  03/16/2027       Past Medical History:   Diagnosis Date    Allergy        Past Surgical History:   Procedure Laterality Date    BREAST BIOPSY         family history includes Cancer in her brother and sister; Ovarian cancer in her sister.    Social History     Tobacco Use    Smoking status: Never    Smokeless tobacco: Never   Substance Use Topics    Alcohol use: Never    Drug use: Never       Review of Systems   Constitutional:  Negative for chills and fever.   Respiratory:  Negative for cough and shortness of breath.    Cardiovascular:  Negative for chest pain and palpitations.   Gastrointestinal:  Negative for constipation, diarrhea, nausea and vomiting.   Genitourinary:  Negative for dysuria and hematuria.   Musculoskeletal:  Negative for falls.      Outpatient Encounter Medications as of 4/27/2023   Medication Sig Dispense Refill    cetirizine (ZYRTEC) 10 MG tablet Take 10 mg by mouth.      cyclobenzaprine  (FLEXERIL) 10 MG tablet       HYDROcodone-acetaminophen (NORCO) 5-325 mg per tablet       ibuprofen (ADVIL,MOTRIN) 800 MG tablet       norelgestromin-ethinyl estradiol (ORTHO EVRA) 150-35 mcg/24 hr Place 1 patch onto the skin every 7 days. Apply 1 patch per week for 3 weeks, then none for 1 week. 3 patch 12    promethazine-dextromethorphan (PROMETHAZINE-DM) 6.25-15 mg/5 mL Syrp Take 5 mLs by mouth every 4 (four) hours as needed. 118 mL 0     No facility-administered encounter medications on file as of 4/27/2023.        Review of patient's allergies indicates:  No Known Allergies    Physical Exam      Vital Signs  Temp: 98.4 °F (36.9 °C)  Pulse: (!) 52  SpO2: 98 %  BP: 108/64  Height and Weight  Weight: 65.4 kg (144 lb 2.9 oz)]    Physical Exam  Constitutional:       Appearance: She is well-developed.   HENT:      Head: Normocephalic and atraumatic.      Mouth/Throat:      Pharynx: Posterior oropharyngeal erythema present.   Cardiovascular:      Rate and Rhythm: Normal rate and regular rhythm.      Heart sounds: Normal heart sounds. No murmur heard.  Pulmonary:      Effort: Pulmonary effort is normal. No respiratory distress.      Breath sounds: Normal breath sounds.   Abdominal:      General: There is no distension.      Palpations: Abdomen is soft.      Tenderness: There is no abdominal tenderness. There is no guarding.   Skin:     General: Skin is warm and dry.   Neurological:      Mental Status: She is alert. Mental status is at baseline.   Psychiatric:         Behavior: Behavior normal.        Laboratory:  CBC:  No results for input(s): WBC, RBC, HGB, HCT, PLT, MCV, MCH, MCHC in the last 2160 hours.  CMP:  No results for input(s): GLU, CALCIUM, ALBUMIN, PROT, NA, K, CO2, CL, BUN, ALKPHOS, ALT, AST, BILITOT in the last 2160 hours.    Invalid input(s): CREATININ  URINALYSIS:  No results for input(s): COLORU, CLARITYU, SPECGRAV, PHUR, PROTEINUA, GLUCOSEU, BILIRUBINCON, BLOODU, WBCU, RBCU, BACTERIA, MUCUS, NITRITE,  LEUKOCYTESUR, UROBILINOGEN, HYALINECASTS in the last 2160 hours.   LIPIDS:  No results for input(s): TSH, HDL, CHOL, TRIG, LDLCALC, CHOLHDL, NONHDLCHOL, TOTALCHOLEST in the last 2160 hours.  TSH:  No results for input(s): TSH in the last 2160 hours.  A1C:  No results for input(s): HGBA1C in the last 2160 hours.    Radiology:  No results found in the last 30 days.     Assessment/Plan     Rajendra Blackmon is a 47 y.o.female with:    1. Cough, unspecified type    2. Myalgia    3. Sinus congestion    4. Nonintractable headache, unspecified chronicity pattern, unspecified headache type    -COVID test at home, we do not have them here in the clinic  -Continue current medications and maintain follow up with specialists.    -Follow up if symptoms worsen or fail to improve.       Christina Negro MD  [facility]

## 2023-04-27 NOTE — PATIENT INSTRUCTIONS
Try the following over the counter medications to help with your symptoms:  1. Antihistamine once daily (either Zyrtec, Allegra, Claritin or Xyzal)  2. Flonase nasal spray once daily (fluticasone is generic)    3. Mucinex twice daily (Guaifenesin is generic)  4. Ibuprofen or Tylenol for sore throat, headache and body aches    Drink lots of water!

## 2023-05-29 ENCOUNTER — OFFICE VISIT (OUTPATIENT)
Dept: PRIMARY CARE CLINIC | Facility: CLINIC | Age: 48
End: 2023-05-29
Payer: COMMERCIAL

## 2023-05-29 VITALS
OXYGEN SATURATION: 98 % | SYSTOLIC BLOOD PRESSURE: 108 MMHG | BODY MASS INDEX: 26.55 KG/M2 | HEART RATE: 74 BPM | DIASTOLIC BLOOD PRESSURE: 64 MMHG | WEIGHT: 140.63 LBS | HEIGHT: 61 IN

## 2023-05-29 DIAGNOSIS — B37.2 CANDIDAL INTERTRIGO: Primary | ICD-10-CM

## 2023-05-29 PROCEDURE — 1160F RVW MEDS BY RX/DR IN RCRD: CPT | Mod: CPTII,S$GLB,, | Performed by: STUDENT IN AN ORGANIZED HEALTH CARE EDUCATION/TRAINING PROGRAM

## 2023-05-29 PROCEDURE — 1160F PR REVIEW ALL MEDS BY PRESCRIBER/CLIN PHARMACIST DOCUMENTED: ICD-10-PCS | Mod: CPTII,S$GLB,, | Performed by: STUDENT IN AN ORGANIZED HEALTH CARE EDUCATION/TRAINING PROGRAM

## 2023-05-29 PROCEDURE — 3008F PR BODY MASS INDEX (BMI) DOCUMENTED: ICD-10-PCS | Mod: CPTII,S$GLB,, | Performed by: STUDENT IN AN ORGANIZED HEALTH CARE EDUCATION/TRAINING PROGRAM

## 2023-05-29 PROCEDURE — 99213 OFFICE O/P EST LOW 20 MIN: CPT | Mod: S$GLB,,, | Performed by: STUDENT IN AN ORGANIZED HEALTH CARE EDUCATION/TRAINING PROGRAM

## 2023-05-29 PROCEDURE — 99999 PR PBB SHADOW E&M-EST. PATIENT-LVL III: CPT | Mod: PBBFAC,,, | Performed by: STUDENT IN AN ORGANIZED HEALTH CARE EDUCATION/TRAINING PROGRAM

## 2023-05-29 PROCEDURE — 99213 PR OFFICE/OUTPT VISIT, EST, LEVL III, 20-29 MIN: ICD-10-PCS | Mod: S$GLB,,, | Performed by: STUDENT IN AN ORGANIZED HEALTH CARE EDUCATION/TRAINING PROGRAM

## 2023-05-29 PROCEDURE — 3008F BODY MASS INDEX DOCD: CPT | Mod: CPTII,S$GLB,, | Performed by: STUDENT IN AN ORGANIZED HEALTH CARE EDUCATION/TRAINING PROGRAM

## 2023-05-29 PROCEDURE — 99999 PR PBB SHADOW E&M-EST. PATIENT-LVL III: ICD-10-PCS | Mod: PBBFAC,,, | Performed by: STUDENT IN AN ORGANIZED HEALTH CARE EDUCATION/TRAINING PROGRAM

## 2023-05-29 PROCEDURE — 3078F DIAST BP <80 MM HG: CPT | Mod: CPTII,S$GLB,, | Performed by: STUDENT IN AN ORGANIZED HEALTH CARE EDUCATION/TRAINING PROGRAM

## 2023-05-29 PROCEDURE — 1159F PR MEDICATION LIST DOCUMENTED IN MEDICAL RECORD: ICD-10-PCS | Mod: CPTII,S$GLB,, | Performed by: STUDENT IN AN ORGANIZED HEALTH CARE EDUCATION/TRAINING PROGRAM

## 2023-05-29 PROCEDURE — 3074F SYST BP LT 130 MM HG: CPT | Mod: CPTII,S$GLB,, | Performed by: STUDENT IN AN ORGANIZED HEALTH CARE EDUCATION/TRAINING PROGRAM

## 2023-05-29 PROCEDURE — 3074F PR MOST RECENT SYSTOLIC BLOOD PRESSURE < 130 MM HG: ICD-10-PCS | Mod: CPTII,S$GLB,, | Performed by: STUDENT IN AN ORGANIZED HEALTH CARE EDUCATION/TRAINING PROGRAM

## 2023-05-29 PROCEDURE — 1159F MED LIST DOCD IN RCRD: CPT | Mod: CPTII,S$GLB,, | Performed by: STUDENT IN AN ORGANIZED HEALTH CARE EDUCATION/TRAINING PROGRAM

## 2023-05-29 PROCEDURE — 3078F PR MOST RECENT DIASTOLIC BLOOD PRESSURE < 80 MM HG: ICD-10-PCS | Mod: CPTII,S$GLB,, | Performed by: STUDENT IN AN ORGANIZED HEALTH CARE EDUCATION/TRAINING PROGRAM

## 2023-05-29 RX ORDER — FLUCONAZOLE 150 MG/1
150 TABLET ORAL DAILY
Qty: 7 TABLET | Refills: 0 | Status: SHIPPED | OUTPATIENT
Start: 2023-05-29 | End: 2023-06-05

## 2023-05-29 RX ORDER — NYSTATIN 100000 [USP'U]/G
POWDER TOPICAL 4 TIMES DAILY
Qty: 30 G | Refills: 0 | Status: SHIPPED | OUTPATIENT
Start: 2023-05-29

## 2023-05-31 NOTE — PROGRESS NOTES
INTERNAL MEDICINE SAME DAY PRIMARY CARE VISIT NOTE    Subjective:     Chief Complaint: Rash       Patient ID: Rajendra Blackmon is a 47 y.o. female , here today for focused same-day primary care visit.    Today, patient with complaint of Rash    Rash present underneath bilateral breasts for the past 2 weeks.  The rash dry and itches.  She has tried putting calamine lotion under the breasts without relief.  Endorses wearing sports bra most days.  Does have occasional sweating under the breast.  No fevers or chills.    Past Medical History:  Past Medical History:   Diagnosis Date    Allergy        Home Medications:  Prior to Admission medications    Medication Sig Start Date End Date Taking? Authorizing Provider   cetirizine (ZYRTEC) 10 MG tablet Take 10 mg by mouth.   Yes Historical Provider   cyclobenzaprine (FLEXERIL) 10 MG tablet  5/29/22  Yes Historical Provider   HYDROcodone-acetaminophen (NORCO) 5-325 mg per tablet  5/29/22  Yes Historical Provider   ibuprofen (ADVIL,MOTRIN) 800 MG tablet  5/29/22  Yes Historical Provider   norelgestromin-ethinyl estradiol (ORTHO EVRA) 150-35 mcg/24 hr Place 1 patch onto the skin every 7 days. Apply 1 patch per week for 3 weeks, then none for 1 week. 6/21/22 6/20/23 Yes Stefani Grewal MD   fluconazole (DIFLUCAN) 150 MG Tab Take 1 tablet (150 mg total) by mouth once daily. for 7 days 5/29/23 6/5/23  Brook Simons MD   nystatin (MYCOSTATIN) powder Apply topically 4 (four) times daily. 5/29/23   Brook Simons MD       Allergies:  Review of patient's allergies indicates:  No Known Allergies    Social History:  Social History     Tobacco Use    Smoking status: Never    Smokeless tobacco: Never   Substance Use Topics    Alcohol use: Never    Drug use: Never         Review of Systems   Constitutional:  Negative for diaphoresis, fatigue and fever.   HENT:  Negative for congestion, ear pain and voice change.    Eyes:  Negative for discharge, redness and itching.   Cardiovascular:  " Negative for chest pain.   Gastrointestinal:  Negative for abdominal pain.   Musculoskeletal:  Negative for gait problem and joint swelling.   Skin:  Positive for rash. Negative for color change, pallor and wound.   Neurological:  Negative for weakness.         Objective:   /64 (BP Location: Left arm, Patient Position: Sitting, BP Method: Medium (Manual))   Pulse 74   Ht 5' 1" (1.549 m)   Wt 63.8 kg (140 lb 10.5 oz)   SpO2 98%   BMI 26.58 kg/m²        General: AAO x3, no apparent distress  HEENT: PERRL, OP clear  CV: RRR, no m/r/g  Pulm: Lungs CTAB, no crackles, no wheezes  Abd: s/NT/ND +BS  Extremities: no c/c/e  Skin:  Darkening and dryness of skin folds of bilateral breast    Labs:         Assessment/Plan     Rajendra was seen today for rash.    Diagnoses and all orders for this visit:    Candidal intertrigo  -     fluconazole (DIFLUCAN) 150 MG Tab; Take 1 tablet (150 mg total) by mouth once daily. for 7 days  -     nystatin (MYCOSTATIN) powder; Apply topically 4 (four) times daily.    Advised OTC sensitive skin daily moisturizer (ex Vanicream or Cerave), lukewarm baths, using humidifier in dry/cold weather, wearing cotton fabrics, and using a mild soap or a non-soap cleanser when washing.      RTC prn and with PCP as per routine.    Brook Simons MD  Department of Internal Medicine - [facility]        "

## 2023-08-18 ENCOUNTER — OFFICE VISIT (OUTPATIENT)
Dept: PRIMARY CARE CLINIC | Facility: CLINIC | Age: 48
End: 2023-08-18
Payer: COMMERCIAL

## 2023-08-18 ENCOUNTER — HOSPITAL ENCOUNTER (OUTPATIENT)
Dept: RADIOLOGY | Facility: HOSPITAL | Age: 48
Discharge: HOME | End: 2023-08-18
Attending: NURSE PRACTITIONER
Payer: COMMERCIAL

## 2023-08-18 ENCOUNTER — TELEPHONE (OUTPATIENT)
Dept: PRIMARY CARE CLINIC | Facility: CLINIC | Age: 48
End: 2023-08-18

## 2023-08-18 VITALS
WEIGHT: 140 LBS | DIASTOLIC BLOOD PRESSURE: 62 MMHG | OXYGEN SATURATION: 99 % | RESPIRATION RATE: 16 BRPM | BODY MASS INDEX: 26.45 KG/M2 | SYSTOLIC BLOOD PRESSURE: 110 MMHG | HEART RATE: 70 BPM

## 2023-08-18 DIAGNOSIS — J06.9 UPPER RESPIRATORY TRACT INFECTION, UNSPECIFIED TYPE: ICD-10-CM

## 2023-08-18 DIAGNOSIS — R07.9 CHEST PAIN, UNSPECIFIED TYPE: ICD-10-CM

## 2023-08-18 DIAGNOSIS — J06.9 UPPER RESPIRATORY TRACT INFECTION, UNSPECIFIED TYPE: Primary | ICD-10-CM

## 2023-08-18 DIAGNOSIS — R05.9 COUGH, UNSPECIFIED TYPE: ICD-10-CM

## 2023-08-18 LAB
CTP QC/QA: YES
SARS-COV-2 AG RESP QL IA.RAPID: NEGATIVE

## 2023-08-18 PROCEDURE — 1159F PR MEDICATION LIST DOCUMENTED IN MEDICAL RECORD: ICD-10-PCS | Mod: CPTII,S$GLB,, | Performed by: NURSE PRACTITIONER

## 2023-08-18 PROCEDURE — 93010 ELECTROCARDIOGRAM REPORT: CPT | Mod: S$GLB,,, | Performed by: INTERNAL MEDICINE

## 2023-08-18 PROCEDURE — 71046 XR CHEST PA AND LATERAL: ICD-10-PCS | Mod: 26,,, | Performed by: RADIOLOGY

## 2023-08-18 PROCEDURE — 71046 X-RAY EXAM CHEST 2 VIEWS: CPT | Mod: 26,,, | Performed by: RADIOLOGY

## 2023-08-18 PROCEDURE — 3008F BODY MASS INDEX DOCD: CPT | Mod: CPTII,S$GLB,, | Performed by: NURSE PRACTITIONER

## 2023-08-18 PROCEDURE — 99214 OFFICE O/P EST MOD 30 MIN: CPT | Mod: S$GLB,,, | Performed by: NURSE PRACTITIONER

## 2023-08-18 PROCEDURE — 99999 PR PBB SHADOW E&M-EST. PATIENT-LVL III: ICD-10-PCS | Mod: PBBFAC,,, | Performed by: NURSE PRACTITIONER

## 2023-08-18 PROCEDURE — 93005 ELECTROCARDIOGRAM TRACING: CPT | Mod: S$GLB,,, | Performed by: NURSE PRACTITIONER

## 2023-08-18 PROCEDURE — 87811 SARS CORONAVIRUS 2 ANTIGEN POCT, MANUAL READ: ICD-10-PCS | Mod: QW,S$GLB,, | Performed by: NURSE PRACTITIONER

## 2023-08-18 PROCEDURE — 93005 EKG 12-LEAD: ICD-10-PCS | Mod: S$GLB,,, | Performed by: NURSE PRACTITIONER

## 2023-08-18 PROCEDURE — 3008F PR BODY MASS INDEX (BMI) DOCUMENTED: ICD-10-PCS | Mod: CPTII,S$GLB,, | Performed by: NURSE PRACTITIONER

## 2023-08-18 PROCEDURE — 3074F SYST BP LT 130 MM HG: CPT | Mod: CPTII,S$GLB,, | Performed by: NURSE PRACTITIONER

## 2023-08-18 PROCEDURE — 3078F PR MOST RECENT DIASTOLIC BLOOD PRESSURE < 80 MM HG: ICD-10-PCS | Mod: CPTII,S$GLB,, | Performed by: NURSE PRACTITIONER

## 2023-08-18 PROCEDURE — 71046 X-RAY EXAM CHEST 2 VIEWS: CPT | Mod: TC

## 2023-08-18 PROCEDURE — 87811 SARS-COV-2 COVID19 W/OPTIC: CPT | Mod: QW,S$GLB,, | Performed by: NURSE PRACTITIONER

## 2023-08-18 PROCEDURE — 1159F MED LIST DOCD IN RCRD: CPT | Mod: CPTII,S$GLB,, | Performed by: NURSE PRACTITIONER

## 2023-08-18 PROCEDURE — 3078F DIAST BP <80 MM HG: CPT | Mod: CPTII,S$GLB,, | Performed by: NURSE PRACTITIONER

## 2023-08-18 PROCEDURE — 99214 PR OFFICE/OUTPT VISIT, EST, LEVL IV, 30-39 MIN: ICD-10-PCS | Mod: S$GLB,,, | Performed by: NURSE PRACTITIONER

## 2023-08-18 PROCEDURE — 99999 PR PBB SHADOW E&M-EST. PATIENT-LVL III: CPT | Mod: PBBFAC,,, | Performed by: NURSE PRACTITIONER

## 2023-08-18 PROCEDURE — 1160F RVW MEDS BY RX/DR IN RCRD: CPT | Mod: CPTII,S$GLB,, | Performed by: NURSE PRACTITIONER

## 2023-08-18 PROCEDURE — 93010 EKG 12-LEAD: ICD-10-PCS | Mod: S$GLB,,, | Performed by: INTERNAL MEDICINE

## 2023-08-18 PROCEDURE — 1160F PR REVIEW ALL MEDS BY PRESCRIBER/CLIN PHARMACIST DOCUMENTED: ICD-10-PCS | Mod: CPTII,S$GLB,, | Performed by: NURSE PRACTITIONER

## 2023-08-18 PROCEDURE — 3074F PR MOST RECENT SYSTOLIC BLOOD PRESSURE < 130 MM HG: ICD-10-PCS | Mod: CPTII,S$GLB,, | Performed by: NURSE PRACTITIONER

## 2023-08-18 RX ORDER — METHYLPREDNISOLONE 4 MG/1
TABLET ORAL
Qty: 21 EACH | Refills: 0 | Status: SHIPPED | OUTPATIENT
Start: 2023-08-18 | End: 2023-08-18

## 2023-08-18 RX ORDER — METHYLPREDNISOLONE 4 MG/1
TABLET ORAL
Qty: 21 EACH | Refills: 0 | Status: SHIPPED | OUTPATIENT
Start: 2023-08-18 | End: 2023-09-08

## 2023-08-18 NOTE — PROGRESS NOTES
[facility] Note    Chief Complaint      Chief Complaint   Patient presents with    Insomnia    Chest Pain       History of Present Illness      Rajendra Blackmon is a 47 y.o. female with chronic conditions of  who does not have an established PCP, who presents today for: not sleeping well, nasal congestion, eyes watery, all over body pain, hurts when she coughs, +dry cough. X 5 days. Afebrile at home. Urinating normal. No back pain.   [place]. Allergic to pollen. Unknown history of asthma.   Did not take a Covid test. Will obtain in office.   Declines other labs today. Needs to establish with a PCP.     Past Medical History:  Past Medical History:   Diagnosis Date    Allergy        Past Surgical History:   has a past surgical history that includes Breast biopsy.    Family History:  family history includes Cancer in her brother and sister; Ovarian cancer in her sister.     Social History:  Social History     Tobacco Use    Smoking status: Never    Smokeless tobacco: Never   Substance Use Topics    Alcohol use: Never    Drug use: Never       Review of Systems   Constitutional:  Negative for chills and fever.   HENT:  Positive for congestion. Negative for sore throat.    Respiratory:  Positive for cough (dry). Negative for shortness of breath.    Cardiovascular:  Positive for chest pain. Negative for palpitations.   Gastrointestinal:  Negative for constipation, diarrhea, nausea and vomiting.   Genitourinary:  Negative for dysuria and hematuria.   Musculoskeletal:  Negative for falls.   Neurological:  Negative for headaches.   Psychiatric/Behavioral:  The patient has insomnia.         Medications:  Outpatient Encounter Medications as of 8/18/2023   Medication Sig Dispense Refill    cetirizine (ZYRTEC) 10 MG tablet Take 10 mg by mouth.      cyclobenzaprine (FLEXERIL) 10 MG tablet       HYDROcodone-acetaminophen (NORCO) 5-325 mg per tablet       ibuprofen (ADVIL,MOTRIN) 800 MG  tablet       nystatin (MYCOSTATIN) powder Apply topically 4 (four) times daily. 30 g 0    albuterol sulfate (PROAIR RESPICLICK) 90 mcg/actuation inhaler Inhale 1-2 puffs into the lungs every 4 (four) hours as needed for Wheezing or Shortness of Breath. Rescue 1 each 1    norelgestromin-ethinyl estradiol (ORTHO EVRA) 150-35 mcg/24 hr Place 1 patch onto the skin every 7 days. Apply 1 patch per week for 3 weeks, then none for 1 week. 3 patch 12     No facility-administered encounter medications on file as of 8/18/2023.       Allergies:  Review of patient's allergies indicates:  No Known Allergies    Health Maintenance:    There is no immunization history on file for this patient.   Health Maintenance   Topic Date Due    Hepatitis C Screening  Never done    TETANUS VACCINE  Never done    Colorectal Cancer Screening  Never done    Mammogram  07/18/2023    Lipid Panel  03/16/2027        Physical Exam      Vital Signs  Pulse: 70  Resp: 16  SpO2: 99 %  BP: 110/62  BP Location: Right arm  Patient Position: Sitting  Height and Weight  Weight: 63.5 kg (139 lb 15.9 oz)]    Physical Exam  Constitutional:       Appearance: She is well-developed.   HENT:      Head: Normocephalic and atraumatic.      Right Ear: Tympanic membrane normal.      Left Ear: Tympanic membrane normal.      Mouth/Throat:      Mouth: Mucous membranes are moist.   Cardiovascular:      Rate and Rhythm: Normal rate and regular rhythm.      Heart sounds: Normal heart sounds. No murmur heard.  Pulmonary:      Effort: Pulmonary effort is normal. No respiratory distress.      Breath sounds: Normal breath sounds.   Abdominal:      General: There is no distension.      Palpations: Abdomen is soft.      Tenderness: There is no abdominal tenderness. There is no guarding.   Skin:     General: Skin is warm and dry.   Neurological:      Mental Status: She is alert. Mental status is at baseline.   Psychiatric:         Behavior: Behavior normal.           Laboratory:  CBC:  Recent Labs   Lab 03/16/22  1027   WBC 6.42   RBC 4.79   Hemoglobin 13.9   Hematocrit 41.6   Platelets 291   MCV 87   MCH 29.0   MCHC 33.4     CMP:  Recent Labs   Lab 03/16/22  1027   Glucose 90   Calcium 10.0   Albumin 4.2   Total Protein 8.1   Sodium 140   Potassium 4.0   CO2 21 L   Chloride 107   BUN 13   Alkaline Phosphatase 93   ALT 25   AST 21   Total Bilirubin 0.3     URINALYSIS:       LIPIDS:  Recent Labs   Lab 03/16/22  1027   TSH 2.315   HDL 36 L   Cholesterol 194   Triglycerides 264 H   LDL Cholesterol 105.2   HDL/Cholesterol Ratio 18.6 L   Non-HDL Cholesterol 158   Total Cholesterol/HDL Ratio 5.4 H     TSH:  Recent Labs   Lab 03/16/22  1027   TSH 2.315     A1C:        Assessment/Plan     Rajendra Blackmon is a 47 y.o.female with:    1. Upper respiratory tract infection, unspecified type  - X-Ray Chest PA And Lateral; Future  - SARS Coronavirus 2 Antigen, POCT Manual Read- Negative  Will get a chest x-ray and PFTs, has had sob/wheezing in past.  Work up for asthma. Send in inhaler and steroids.    Continue OTC antihistamines, flonase. Drink plenty of fluid.   Follow up if symptoms do not improve     2. Chest pain, unspecified type  - IN OFFICE EKG 12-LEAD (to Muse)  Normal sinus rhythm with sinus arrhythmia     Try the following over the counter medications to help with your symptoms:  1. Antihistamine once daily (either Zyrtec, Allegra, Claritin or Xyzal)  2. Flonase nasal spray once daily (fluticasone is generic)    3. Mucinex twice daily (Guaifenesin is generic)  4. Ibuprofen or Tylenol for sore throat, headache and body aches    Chronic conditions status updated as per HPI.  Other than changes above, cont current medications and maintain follow up with specialists.  Follow up in about 1 week (around 8/25/2023).    No future appointments.    Adrienne Cotaya, FNP [facility]

## 2023-08-18 NOTE — TELEPHONE ENCOUNTER
----- Message from Geneva Onofre NP sent at 8/18/2023  3:16 PM CDT -----  Please let patient know chest x-ray showed that her lungs are clear.   Sent in inhaler and steroids to [facility]  Go to ER over weekend if have difficulty breathing.    Thanks   Geneva Onofre NP

## 2023-09-18 ENCOUNTER — LAB VISIT (OUTPATIENT)
Dept: LAB | Facility: HOSPITAL | Age: 48
End: 2023-09-18
Payer: COMMERCIAL

## 2023-09-18 ENCOUNTER — OFFICE VISIT (OUTPATIENT)
Dept: INTERNAL MEDICINE | Facility: CLINIC | Age: 48
End: 2023-09-18
Payer: COMMERCIAL

## 2023-09-18 VITALS
WEIGHT: 138.44 LBS | HEIGHT: 61 IN | BODY MASS INDEX: 26.14 KG/M2 | RESPIRATION RATE: 14 BRPM | TEMPERATURE: 98 F | DIASTOLIC BLOOD PRESSURE: 70 MMHG | HEART RATE: 76 BPM | SYSTOLIC BLOOD PRESSURE: 116 MMHG | OXYGEN SATURATION: 98 %

## 2023-09-18 DIAGNOSIS — R07.9 CHEST PAIN, UNSPECIFIED TYPE: ICD-10-CM

## 2023-09-18 DIAGNOSIS — Z00.00 ENCOUNTER FOR ANNUAL HEALTH EXAMINATION: ICD-10-CM

## 2023-09-18 DIAGNOSIS — Z00.00 ENCOUNTER FOR ANNUAL HEALTH EXAMINATION: Primary | ICD-10-CM

## 2023-09-18 LAB
ALBUMIN SERPL BCP-MCNC: 4.3 G/DL (ref 3.5–5.2)
ALP SERPL-CCNC: 86 U/L (ref 55–135)
ALT SERPL W/O P-5'-P-CCNC: 27 U/L (ref 10–44)
ANION GAP SERPL CALC-SCNC: 10 MMOL/L (ref 8–16)
AST SERPL-CCNC: 30 U/L (ref 10–40)
BASOPHILS # BLD AUTO: 0.05 K/UL (ref 0–0.2)
BASOPHILS NFR BLD: 0.8 % (ref 0–1.9)
BILIRUB SERPL-MCNC: 0.4 MG/DL (ref 0.1–1)
BUN SERPL-MCNC: 9 MG/DL (ref 6–20)
CALCIUM SERPL-MCNC: 9.8 MG/DL (ref 8.7–10.5)
CHLORIDE SERPL-SCNC: 104 MMOL/L (ref 95–110)
CHOLEST SERPL-MCNC: 219 MG/DL (ref 120–199)
CHOLEST/HDLC SERPL: 7.1 {RATIO} (ref 2–5)
CO2 SERPL-SCNC: 22 MMOL/L (ref 23–29)
CREAT SERPL-MCNC: 0.9 MG/DL (ref 0.5–1.4)
DIFFERENTIAL METHOD: NORMAL
EOSINOPHIL # BLD AUTO: 0.4 K/UL (ref 0–0.5)
EOSINOPHIL NFR BLD: 6.5 % (ref 0–8)
ERYTHROCYTE [DISTWIDTH] IN BLOOD BY AUTOMATED COUNT: 12.4 % (ref 11.5–14.5)
EST. GFR  (NO RACE VARIABLE): >60 ML/MIN/1.73 M^2
GLUCOSE SERPL-MCNC: 82 MG/DL (ref 70–110)
HCT VFR BLD AUTO: 39.2 % (ref 37–48.5)
HCV AB SERPL QL IA: NORMAL
HDLC SERPL-MCNC: 31 MG/DL (ref 40–75)
HDLC SERPL: 14.2 % (ref 20–50)
HGB BLD-MCNC: 13.3 G/DL (ref 12–16)
HIV 1+2 AB+HIV1 P24 AG SERPL QL IA: NORMAL
IMM GRANULOCYTES # BLD AUTO: 0.01 K/UL (ref 0–0.04)
IMM GRANULOCYTES NFR BLD AUTO: 0.2 % (ref 0–0.5)
LDLC SERPL CALC-MCNC: ABNORMAL MG/DL (ref 63–159)
LYMPHOCYTES # BLD AUTO: 2.9 K/UL (ref 1–4.8)
LYMPHOCYTES NFR BLD: 44.3 % (ref 18–48)
MCH RBC QN AUTO: 28.6 PG (ref 27–31)
MCHC RBC AUTO-ENTMCNC: 33.9 G/DL (ref 32–36)
MCV RBC AUTO: 84 FL (ref 82–98)
MONOCYTES # BLD AUTO: 0.4 K/UL (ref 0.3–1)
MONOCYTES NFR BLD: 6.2 % (ref 4–15)
NEUTROPHILS # BLD AUTO: 2.8 K/UL (ref 1.8–7.7)
NEUTROPHILS NFR BLD: 42 % (ref 38–73)
NONHDLC SERPL-MCNC: 188 MG/DL
NRBC BLD-RTO: 0 /100 WBC
PLATELET # BLD AUTO: 287 K/UL (ref 150–450)
PMV BLD AUTO: 10.1 FL (ref 9.2–12.9)
POTASSIUM SERPL-SCNC: 3.7 MMOL/L (ref 3.5–5.1)
PROT SERPL-MCNC: 8.1 G/DL (ref 6–8.4)
RBC # BLD AUTO: 4.65 M/UL (ref 4–5.4)
SODIUM SERPL-SCNC: 136 MMOL/L (ref 136–145)
TRIGL SERPL-MCNC: 445 MG/DL (ref 30–150)
TSH SERPL DL<=0.005 MIU/L-ACNC: 2.39 UIU/ML (ref 0.4–4)
WBC # BLD AUTO: 6.59 K/UL (ref 3.9–12.7)

## 2023-09-18 PROCEDURE — 3008F BODY MASS INDEX DOCD: CPT | Mod: CPTII,S$GLB,, | Performed by: NURSE PRACTITIONER

## 2023-09-18 PROCEDURE — 3078F PR MOST RECENT DIASTOLIC BLOOD PRESSURE < 80 MM HG: ICD-10-PCS | Mod: CPTII,S$GLB,, | Performed by: NURSE PRACTITIONER

## 2023-09-18 PROCEDURE — 99396 PREV VISIT EST AGE 40-64: CPT | Mod: S$GLB,,, | Performed by: NURSE PRACTITIONER

## 2023-09-18 PROCEDURE — 87389 HIV-1 AG W/HIV-1&-2 AB AG IA: CPT | Performed by: NURSE PRACTITIONER

## 2023-09-18 PROCEDURE — 3008F PR BODY MASS INDEX (BMI) DOCUMENTED: ICD-10-PCS | Mod: CPTII,S$GLB,, | Performed by: NURSE PRACTITIONER

## 2023-09-18 PROCEDURE — 3044F PR MOST RECENT HEMOGLOBIN A1C LEVEL <7.0%: ICD-10-PCS | Mod: CPTII,S$GLB,, | Performed by: NURSE PRACTITIONER

## 2023-09-18 PROCEDURE — 3044F HG A1C LEVEL LT 7.0%: CPT | Mod: CPTII,S$GLB,, | Performed by: NURSE PRACTITIONER

## 2023-09-18 PROCEDURE — 80061 LIPID PANEL: CPT | Performed by: NURSE PRACTITIONER

## 2023-09-18 PROCEDURE — 99999 PR PBB SHADOW E&M-EST. PATIENT-LVL V: CPT | Mod: PBBFAC,,, | Performed by: NURSE PRACTITIONER

## 2023-09-18 PROCEDURE — 3078F DIAST BP <80 MM HG: CPT | Mod: CPTII,S$GLB,, | Performed by: NURSE PRACTITIONER

## 2023-09-18 PROCEDURE — 3074F SYST BP LT 130 MM HG: CPT | Mod: CPTII,S$GLB,, | Performed by: NURSE PRACTITIONER

## 2023-09-18 PROCEDURE — 80053 COMPREHEN METABOLIC PANEL: CPT | Performed by: NURSE PRACTITIONER

## 2023-09-18 PROCEDURE — 36415 COLL VENOUS BLD VENIPUNCTURE: CPT | Mod: PO | Performed by: NURSE PRACTITIONER

## 2023-09-18 PROCEDURE — 1159F PR MEDICATION LIST DOCUMENTED IN MEDICAL RECORD: ICD-10-PCS | Mod: CPTII,S$GLB,, | Performed by: NURSE PRACTITIONER

## 2023-09-18 PROCEDURE — 84443 ASSAY THYROID STIM HORMONE: CPT | Performed by: NURSE PRACTITIONER

## 2023-09-18 PROCEDURE — 99999 PR PBB SHADOW E&M-EST. PATIENT-LVL V: ICD-10-PCS | Mod: PBBFAC,,, | Performed by: NURSE PRACTITIONER

## 2023-09-18 PROCEDURE — 1159F MED LIST DOCD IN RCRD: CPT | Mod: CPTII,S$GLB,, | Performed by: NURSE PRACTITIONER

## 2023-09-18 PROCEDURE — 86803 HEPATITIS C AB TEST: CPT | Performed by: NURSE PRACTITIONER

## 2023-09-18 PROCEDURE — 85025 COMPLETE CBC W/AUTO DIFF WBC: CPT | Performed by: NURSE PRACTITIONER

## 2023-09-18 PROCEDURE — 83036 HEMOGLOBIN GLYCOSYLATED A1C: CPT | Performed by: NURSE PRACTITIONER

## 2023-09-18 PROCEDURE — 99396 PR PREVENTIVE VISIT,EST,40-64: ICD-10-PCS | Mod: S$GLB,,, | Performed by: NURSE PRACTITIONER

## 2023-09-18 PROCEDURE — 3074F PR MOST RECENT SYSTOLIC BLOOD PRESSURE < 130 MM HG: ICD-10-PCS | Mod: CPTII,S$GLB,, | Performed by: NURSE PRACTITIONER

## 2023-09-18 RX ORDER — BUSPIRONE HYDROCHLORIDE 5 MG/1
5 TABLET ORAL 2 TIMES DAILY
Qty: 60 TABLET | Refills: 0 | Status: SHIPPED | OUTPATIENT
Start: 2023-09-18 | End: 2024-09-17

## 2023-09-18 NOTE — PROGRESS NOTES
Subjective:       Patient ID: Rajendra Blackmon is a 48 y.o. female.    Chief Complaint: Annual Wellness Visit    Video Nauruan Translation Used: [id number]    Rajendra Blackmon is a 48 y.o. female who presents today for an annual wellness visit.     She reports chest pain and difficulty breathing x 1 month.   Notes she was recently feeling depressed and often feels anxious in the past month.     The difficulty breathing starts first and then leads to her heart racing and subsequently chest pain.   Was evaluated on  for the same with normal EKG and CXR.     Occurs at rest only and happens daily.     Review of patient's allergies indicates:  No Known Allergies   Medication List with Changes/Refills   New Medications    BUSPIRONE (BUSPAR) 5 MG TAB    Take 1 tablet (5 mg total) by mouth 2 (two) times daily.   Current Medications    ALBUTEROL SULFATE (PROAIR RESPICLICK) 90 MCG/ACTUATION INHALER    Inhale 1-2 puffs into the lungs every 4 (four) hours as needed for Wheezing or Shortness of Breath. Rescue    CETIRIZINE (ZYRTEC) 10 MG TABLET    Take 10 mg by mouth.    CYCLOBENZAPRINE (FLEXERIL) 10 MG TABLET        HYDROCODONE-ACETAMINOPHEN (NORCO) 5-325 MG PER TABLET        IBUPROFEN (ADVIL,MOTRIN) 800 MG TABLET        NORELGESTROMIN-ETHINYL ESTRADIOL (ORTHO EVRA) 150-35 MCG/24 HR    Place 1 patch onto the skin every 7 days. Apply 1 patch per week for 3 weeks, then none for 1 week.    NYSTATIN (MYCOSTATIN) POWDER    Apply topically 4 (four) times daily.       Health Maintenance    MM2022  PAP: 2022  Hepatitis C Screen: 2023   HIV Screen: 2023      Patient ambulates on her own without an assistive device.     On average, how many days per week do you do moderate to strenuous exercise:  (like a brisk walk or jog; this does not include your job/work)  1-3   On average, how many minutes do you exercise at this level each day? 30  We encourage you to start exercising if you do not already, continue at your current  "level or increase your level of activity.     Do you eat fruits and vegetable every day?  Often, but not daily    Do you still have a menstrual cycle? Yes       If yes, please describe your cycles: Irregular    Do you go to the dentist regularly? Yes  When was you last exam:     Do you go to the eye doctor regularly? No  When was your last exam:     Review of Systems   Constitutional:  Negative for chills and fever.   Respiratory:  Positive for shortness of breath. Negative for cough.    Cardiovascular:  Positive for chest pain and palpitations.   Neurological:  Negative for dizziness and headaches.     Objective:     /70 (BP Location: Left arm, Patient Position: Sitting, BP Method: Medium (Manual))   Pulse 76   Temp 97.5 °F (36.4 °C) (Temporal)   Resp 14   Ht 5' 1" (1.549 m)   Wt 62.8 kg (138 lb 7.2 oz)   SpO2 98%   BMI 26.16 kg/m²     Physical Exam  Vitals reviewed.   Constitutional:       Appearance: Normal appearance.   HENT:      Head: Normocephalic and atraumatic.   Cardiovascular:      Rate and Rhythm: Normal rate and regular rhythm.      Heart sounds: Normal heart sounds. No murmur heard.  Pulmonary:      Effort: Pulmonary effort is normal.      Breath sounds: Normal breath sounds. No wheezing.   Skin:     General: Skin is warm and dry.   Neurological:      Mental Status: She is alert and oriented to person, place, and time.       BP Readings from Last 3 Encounters:   09/18/23 116/70   08/18/23 110/62   05/29/23 108/64     Wt Readings from Last 3 Encounters:   09/18/23 62.8 kg (138 lb 7.2 oz)   08/18/23 63.5 kg (139 lb 15.9 oz)   05/29/23 63.8 kg (140 lb 10.5 oz)     I reviewed and independently interpreted the labs and imaging below:  Last Labs:  Glucose   Date Value Ref Range Status   09/18/2023 82 70 - 110 mg/dL Final   03/16/2022 90 70 - 110 mg/dL Final     BUN   Date Value Ref Range Status   09/18/2023 9 6 - 20 mg/dL Final   03/16/2022 13 6 - 20 mg/dL Final     Creatinine   Date Value Ref " "Range Status   09/18/2023 0.9 0.5 - 1.4 mg/dL Final   03/16/2022 0.8 0.5 - 1.4 mg/dL Final     Cholesterol   Date Value Ref Range Status   09/18/2023 219 (H) 120 - 199 mg/dL Final     Comment:     The National Cholesterol Education Program (NCEP) has set the  following guidelines (reference ranges) for Cholesterol:  Optimal.....................<200 mg/dL  Borderline High.............200-239 mg/dL  High........................> or = 240 mg/dL     03/16/2022 194 120 - 199 mg/dL Final     Comment:     The National Cholesterol Education Program (NCEP) has set the  following guidelines (reference ranges) for Cholesterol:  Optimal.....................<200 mg/dL  Borderline High.............200-239 mg/dL  High........................> or = 240 mg/dL       Hemoglobin A1C   Date Value Ref Range Status   09/18/2023 5.4 4.0 - 5.6 % Final     Comment:     ADA Screening Guidelines:  5.7-6.4%  Consistent with prediabetes  >or=6.5%  Consistent with diabetes    High levels of fetal hemoglobin interfere with the HbA1C  assay. Heterozygous hemoglobin variants (HbS, HgC, etc)do  not significantly interfere with this assay.   However, presence of multiple variants may affect accuracy.       Hemoglobin   Date Value Ref Range Status   09/18/2023 13.3 12.0 - 16.0 g/dL Final   03/16/2022 13.9 12.0 - 16.0 g/dL Final     Hematocrit   Date Value Ref Range Status   09/18/2023 39.2 37.0 - 48.5 % Final   03/16/2022 41.6 37.0 - 48.5 % Final     No results found for: "[id number]"    08/18/23   IN OFFICE EKG 12-LEAD     Vent. Rate : 060 BPM     Atrial Rate : 060 BPM      P-R Int : 150 ms          QRS Dur : 076 ms       QT Int : 392 ms       P-R-T Axes : 049 025 016 degrees      QTc Int : 392 ms     Normal sinus rhythm with sinus arrhythmia   Normal ECG   No previous ECGs available     8/18/2023   X-Ray Chest PA And Lateral    Heart size normal.  The lungs are clear.  No pleural effusion     Assessment and Plan:     1. Encounter for annual health " examination    --Nutrition: Discussed the importance of moderate caffeine intake. Adhering to a low sodium, low saturated fat/low cholesterol diet.   --Exercise: Discussed the importance of regular exercise. At least 30 minutes 5 days per week.   --Immunizations reviewed.    - CBC Auto Differential; Future  - Comprehensive Metabolic Panel; Future  - Lipid Panel; Future  - TSH; Future  - HIV 1/2 Ag/Ab (4th Gen); Future  - Hepatitis C Antibody; Future  - Hemoglobin A1C; Future    2. Chest pain, unspecified type    Anxiety vs Cardiac vs GERD    - Ambulatory referral/consult to Cardiology; Future  - Holter monitor - 48 hour; Future  - busPIRone (BUSPAR) 5 MG Tab; Take 1 tablet (5 mg total) by mouth 2 (two) times daily.  Dispense: 60 tablet; Refill: 0

## 2023-09-19 LAB
ESTIMATED AVG GLUCOSE: 108 MG/DL (ref 68–131)
HBA1C MFR BLD: 5.4 % (ref 4–5.6)

## 2023-09-21 ENCOUNTER — TELEPHONE (OUTPATIENT)
Dept: INTERNAL MEDICINE | Facility: CLINIC | Age: 48
End: 2023-09-21
Payer: COMMERCIAL

## 2023-09-21 ENCOUNTER — TELEPHONE (OUTPATIENT)
Dept: INTERNAL MEDICINE | Facility: CLINIC | Age: 48
End: 2023-09-21

## 2023-09-21 NOTE — TELEPHONE ENCOUNTER
Pt informed of elevated cholesterol level and pt state she hasn't had any chest pain since starting medication.

## 2023-09-21 NOTE — TELEPHONE ENCOUNTER
----- Message from Marialuisa Jenkins NP sent at 9/19/2023 11:47 AM CDT -----  Please call patient and let her know that her cholesterol is elevated.     How is the medication working for her anxiety -- did she have chest pain last night?

## 2023-09-25 ENCOUNTER — PATIENT MESSAGE (OUTPATIENT)
Dept: INTERNAL MEDICINE | Facility: CLINIC | Age: 48
End: 2023-09-25
Payer: COMMERCIAL

## 2023-10-30 ENCOUNTER — OFFICE VISIT (OUTPATIENT)
Dept: CARDIOLOGY | Facility: CLINIC | Age: 48
End: 2023-10-30
Payer: COMMERCIAL

## 2023-10-30 VITALS
SYSTOLIC BLOOD PRESSURE: 102 MMHG | BODY MASS INDEX: 26.74 KG/M2 | DIASTOLIC BLOOD PRESSURE: 66 MMHG | HEIGHT: 61 IN | WEIGHT: 141.63 LBS | HEART RATE: 61 BPM

## 2023-10-30 DIAGNOSIS — F41.9 ANXIETY: ICD-10-CM

## 2023-10-30 DIAGNOSIS — R00.2 HEART PALPITATIONS: ICD-10-CM

## 2023-10-30 DIAGNOSIS — R07.9 CHEST PAIN, UNSPECIFIED TYPE: Primary | ICD-10-CM

## 2023-10-30 DIAGNOSIS — E78.2 ELEVATED CHOLESTEROL WITH HIGH TRIGLYCERIDES: ICD-10-CM

## 2023-10-30 PROCEDURE — 3044F HG A1C LEVEL LT 7.0%: CPT | Mod: CPTII,S$GLB,, | Performed by: INTERNAL MEDICINE

## 2023-10-30 PROCEDURE — 99204 OFFICE O/P NEW MOD 45 MIN: CPT | Mod: S$GLB,,, | Performed by: INTERNAL MEDICINE

## 2023-10-30 PROCEDURE — 1159F PR MEDICATION LIST DOCUMENTED IN MEDICAL RECORD: ICD-10-PCS | Mod: CPTII,S$GLB,, | Performed by: INTERNAL MEDICINE

## 2023-10-30 PROCEDURE — 3074F SYST BP LT 130 MM HG: CPT | Mod: CPTII,S$GLB,, | Performed by: INTERNAL MEDICINE

## 2023-10-30 PROCEDURE — 3008F BODY MASS INDEX DOCD: CPT | Mod: CPTII,S$GLB,, | Performed by: INTERNAL MEDICINE

## 2023-10-30 PROCEDURE — 3044F PR MOST RECENT HEMOGLOBIN A1C LEVEL <7.0%: ICD-10-PCS | Mod: CPTII,S$GLB,, | Performed by: INTERNAL MEDICINE

## 2023-10-30 PROCEDURE — 3008F PR BODY MASS INDEX (BMI) DOCUMENTED: ICD-10-PCS | Mod: CPTII,S$GLB,, | Performed by: INTERNAL MEDICINE

## 2023-10-30 PROCEDURE — 3078F PR MOST RECENT DIASTOLIC BLOOD PRESSURE < 80 MM HG: ICD-10-PCS | Mod: CPTII,S$GLB,, | Performed by: INTERNAL MEDICINE

## 2023-10-30 PROCEDURE — 99999 PR PBB SHADOW E&M-EST. PATIENT-LVL IV: ICD-10-PCS | Mod: PBBFAC,,, | Performed by: INTERNAL MEDICINE

## 2023-10-30 PROCEDURE — 3078F DIAST BP <80 MM HG: CPT | Mod: CPTII,S$GLB,, | Performed by: INTERNAL MEDICINE

## 2023-10-30 PROCEDURE — 93010 EKG 12-LEAD: ICD-10-PCS | Mod: S$GLB,,, | Performed by: INTERNAL MEDICINE

## 2023-10-30 PROCEDURE — 99999 PR PBB SHADOW E&M-EST. PATIENT-LVL IV: CPT | Mod: PBBFAC,,, | Performed by: INTERNAL MEDICINE

## 2023-10-30 PROCEDURE — 1160F PR REVIEW ALL MEDS BY PRESCRIBER/CLIN PHARMACIST DOCUMENTED: ICD-10-PCS | Mod: CPTII,S$GLB,, | Performed by: INTERNAL MEDICINE

## 2023-10-30 PROCEDURE — 3074F PR MOST RECENT SYSTOLIC BLOOD PRESSURE < 130 MM HG: ICD-10-PCS | Mod: CPTII,S$GLB,, | Performed by: INTERNAL MEDICINE

## 2023-10-30 PROCEDURE — 1160F RVW MEDS BY RX/DR IN RCRD: CPT | Mod: CPTII,S$GLB,, | Performed by: INTERNAL MEDICINE

## 2023-10-30 PROCEDURE — 1159F MED LIST DOCD IN RCRD: CPT | Mod: CPTII,S$GLB,, | Performed by: INTERNAL MEDICINE

## 2023-10-30 PROCEDURE — 99204 PR OFFICE/OUTPT VISIT, NEW, LEVL IV, 45-59 MIN: ICD-10-PCS | Mod: S$GLB,,, | Performed by: INTERNAL MEDICINE

## 2023-10-30 PROCEDURE — 93005 EKG 12-LEAD: ICD-10-PCS | Mod: S$GLB,,, | Performed by: INTERNAL MEDICINE

## 2023-10-30 PROCEDURE — 93010 ELECTROCARDIOGRAM REPORT: CPT | Mod: S$GLB,,, | Performed by: INTERNAL MEDICINE

## 2023-10-30 PROCEDURE — 93005 ELECTROCARDIOGRAM TRACING: CPT | Mod: S$GLB,,, | Performed by: INTERNAL MEDICINE

## 2023-10-30 NOTE — PROGRESS NOTES
Subjective:   Patient ID:  Rajendra Blackmon is a 48 y.o. female who presents for evaulation of Chest Pain      HPI: Very pleasant female presents for cardiac evaluation. 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  She starts her work at 5 am, finishes around 3 pm, then takes nap and at 5pm goes to gym every day to work out. She lifts weights and does some aerobic exercise. She has issues with anxiety and insomnia. Recently seen by Ms. Jenkins and prescribed Buspar.  She is on contraceptive patch, but recently noted increased anxiety, fatigue, weight gain, palpitations and insomnia.     Her chest discomfort gets worse prior to her period, during patch withdrawal time and her breasts are very tender.      Family history is non-contributory. Patient has 1 glass of wine every evening to help her go to sleep.    ECG-SB, otherwise WNL.    Blood work shows high TG-C. HbA1c is normal.  Patient's sister was just diagnosed with Diabetes.    The 10-year ASCVD risk score (Mateo SEVILLA, et al., 2019) is: 1.6%    Values used to calculate the score:      Age: 48 years      Sex: Female      Is Non- : No      Diabetic: No      Tobacco smoker: No      Systolic Blood Pressure: 102 mmHg      Is BP treated: No      HDL Cholesterol: 31 mg/dL      Total Cholesterol: 219 mg/dL      Past Medical History:   Diagnosis Date    Allergy        Past Surgical History:   Procedure Laterality Date    BREAST BIOPSY         Social History     Socioeconomic History    Marital status:    Tobacco Use    Smoking status: Never    Smokeless tobacco: Never   Substance and Sexual Activity    Alcohol use: Never    Drug use: Never    Sexual activity: Yes     Partners: Male     Birth control/protection: Implant       Review of patient's allergies indicates:  No Known Allergies    Lab Results   Component Value Date     09/18/2023    K 3.7 09/18/2023     09/18/2023    CO2 22 (L) 09/18/2023    BUN 9 09/18/2023     "CREATININE 0.9 09/18/2023    GLU 82 09/18/2023    HGBA1C 5.4 09/18/2023    AST 30 09/18/2023    ALT 27 09/18/2023    ALBUMIN 4.3 09/18/2023    PROT 8.1 09/18/2023    BILITOT 0.4 09/18/2023    WBC 6.59 09/18/2023    HGB 13.3 09/18/2023    HCT 39.2 09/18/2023    MCV 84 09/18/2023     09/18/2023    TSH 2.393 09/18/2023    CHOL 219 (H) 09/18/2023    HDL 31 (L) 09/18/2023    LDLCALC Invalid, Trig>400.0 09/18/2023    TRIG 445 (H) 09/18/2023       Review of Systems   Cardiovascular:  Positive for chest pain, dyspnea on exertion and palpitations.   Musculoskeletal:  Positive for neck pain.   Neurological:  Positive for excessive daytime sleepiness.   Psychiatric/Behavioral:  Positive for depression.        Objective:   Physical Exam  Vitals and nursing note reviewed.   Constitutional:       Appearance: Normal appearance. She is well-developed.      Comments: /66   Pulse 61   Ht 5' 1" (1.549 m)   Wt 64.3 kg (141 lb 10.3 oz)   BMI 26.76 kg/m²      HENT:      Head: Normocephalic.   Eyes:      Pupils: Pupils are equal, round, and reactive to light.   Neck:      Thyroid: No thyromegaly.      Vascular: No carotid bruit.   Cardiovascular:      Rate and Rhythm: Normal rate and regular rhythm.      Pulses: Intact distal pulses.           Carotid pulses are 2+ on the right side and 2+ on the left side.       Radial pulses are 2+ on the right side and 2+ on the left side.        Femoral pulses are 2+ on the right side and 2+ on the left side.       Popliteal pulses are 2+ on the right side and 2+ on the left side.        Dorsalis pedis pulses are 2+ on the right side and 2+ on the left side.        Posterior tibial pulses are 2+ on the right side and 2+ on the left side.      Heart sounds: Normal heart sounds. No murmur heard.     No friction rub. No gallop.   Pulmonary:      Effort: Pulmonary effort is normal. No respiratory distress.      Breath sounds: Normal breath sounds. No wheezing or rales.   Chest:      Chest " wall: No tenderness.   Abdominal:      Palpations: Abdomen is soft.   Musculoskeletal:         General: Normal range of motion.      Cervical back: Normal range of motion and neck supple.   Skin:     General: Skin is warm and dry.   Neurological:      Mental Status: She is alert and oriented to person, place, and time.         Current Outpatient Medications   Medication Sig    cetirizine (ZYRTEC) 10 MG tablet Take 10 mg by mouth.    ibuprofen (ADVIL,MOTRIN) 800 MG tablet     busPIRone (BUSPAR) 5 MG Tab Take 1 tablet (5 mg total) by mouth 2 (two) times daily. (Patient not taking: Reported on 10/30/2023)    cyclobenzaprine (FLEXERIL) 10 MG tablet     HYDROcodone-acetaminophen (NORCO) 5-325 mg per tablet     norelgestromin-ethinyl estradiol (ORTHO EVRA) 150-35 mcg/24 hr Place 1 patch onto the skin every 7 days. Apply 1 patch per week for 3 weeks, then none for 1 week. (Patient not taking: Reported on 10/30/2023)    nystatin (MYCOSTATIN) powder Apply topically 4 (four) times daily. (Patient not taking: Reported on 10/30/2023)     No current facility-administered medications for this visit.       Assessment and Plan:     Problem List Items Addressed This Visit          Cardiology Problems    Elevated cholesterol with high triglycerides       Other    Anxiety     Other Visit Diagnoses       Chest pain, unspecified type    -  Primary    Relevant Orders    IN OFFICE EKG 12-LEAD (to Muse)    Stress Echo Which stress agent will be used? Treadmill Exercise    Heart palpitations                Patient's Medications   New Prescriptions    No medications on file   Previous Medications    BUSPIRONE (BUSPAR) 5 MG TAB    Take 1 tablet (5 mg total) by mouth 2 (two) times daily.    CETIRIZINE (ZYRTEC) 10 MG TABLET    Take 10 mg by mouth.    CYCLOBENZAPRINE (FLEXERIL) 10 MG TABLET        HYDROCODONE-ACETAMINOPHEN (NORCO) 5-325 MG PER TABLET        IBUPROFEN (ADVIL,MOTRIN) 800 MG TABLET        NORELGESTROMIN-ETHINYL ESTRADIOL (ORTHO  EVRA) 150-35 MCG/24 HR    Place 1 patch onto the skin every 7 days. Apply 1 patch per week for 3 weeks, then none for 1 week.    NYSTATIN (MYCOSTATIN) POWDER    Apply topically 4 (four) times daily.   Modified Medications    No medications on file   Discontinued Medications    No medications on file     Symptoms are atypical and do not suggest cardiac etiology.  Suspect multifactorial etiology including excessive pectoral muscle use during work and exercise, premenopausal period and anxiety/depression.  Suggest proper hydration, good sleeping habits, warm up and cool down in the gym and to discuss possible perimenopausal symptoms with gyn specialist.    In the meantime review ordered tests and advise.  If OK may continue to follow with PCP and cardiology as needed.  Thank you for allowing me to participate in the care of this patient. Please do not hesitate to contact me with any questions or concerns.      No follow-ups on file.

## 2024-08-27 ENCOUNTER — HOSPITAL ENCOUNTER (EMERGENCY)
Facility: HOSPITAL | Age: 49
Discharge: HOME | End: 2024-08-27
Attending: EMERGENCY MEDICINE
Payer: COMMERCIAL

## 2024-08-27 ENCOUNTER — NURSE TRIAGE (OUTPATIENT)
Dept: ADMINISTRATIVE | Facility: CLINIC | Age: 49
End: 2024-08-27
Payer: COMMERCIAL

## 2024-08-27 VITALS
WEIGHT: 141.75 LBS | TEMPERATURE: 98 F | SYSTOLIC BLOOD PRESSURE: 113 MMHG | HEIGHT: 61 IN | DIASTOLIC BLOOD PRESSURE: 67 MMHG | OXYGEN SATURATION: 99 % | RESPIRATION RATE: 16 BRPM | HEART RATE: 67 BPM | BODY MASS INDEX: 26.76 KG/M2

## 2024-08-27 DIAGNOSIS — K57.92 ACUTE DIVERTICULITIS: ICD-10-CM

## 2024-08-27 DIAGNOSIS — R93.3 ABNORMAL CT SCAN, COLON: ICD-10-CM

## 2024-08-27 DIAGNOSIS — R10.9 ABDOMINAL PAIN, UNSPECIFIED ABDOMINAL LOCATION: Primary | ICD-10-CM

## 2024-08-27 LAB
ALBUMIN SERPL BCP-MCNC: 3.8 G/DL (ref 3.5–5.2)
ALP SERPL-CCNC: 88 U/L (ref 55–135)
ALT SERPL W/O P-5'-P-CCNC: 31 U/L (ref 10–44)
ANION GAP SERPL CALC-SCNC: 9 MMOL/L (ref 8–16)
AST SERPL-CCNC: 36 U/L (ref 10–40)
B-HCG UR QL: NEGATIVE
BACTERIA #/AREA URNS AUTO: ABNORMAL /HPF
BASOPHILS # BLD AUTO: 0.04 K/UL (ref 0–0.2)
BASOPHILS NFR BLD: 0.4 % (ref 0–1.9)
BILIRUB SERPL-MCNC: 0.6 MG/DL (ref 0.1–1)
BILIRUB UR QL STRIP: NEGATIVE
BUN SERPL-MCNC: 13 MG/DL (ref 6–20)
CALCIUM SERPL-MCNC: 9.9 MG/DL (ref 8.7–10.5)
CHLORIDE SERPL-SCNC: 104 MMOL/L (ref 95–110)
CLARITY UR REFRACT.AUTO: ABNORMAL
CO2 SERPL-SCNC: 22 MMOL/L (ref 23–29)
COLOR UR AUTO: YELLOW
CREAT SERPL-MCNC: 0.9 MG/DL (ref 0.5–1.4)
CTP QC/QA: YES
DIFFERENTIAL METHOD BLD: ABNORMAL
EOSINOPHIL # BLD AUTO: 0.5 K/UL (ref 0–0.5)
EOSINOPHIL NFR BLD: 4.4 % (ref 0–8)
ERYTHROCYTE [DISTWIDTH] IN BLOOD BY AUTOMATED COUNT: 12.1 % (ref 11.5–14.5)
EST. GFR  (NO RACE VARIABLE): >60 ML/MIN/1.73 M^2
GLUCOSE SERPL-MCNC: 112 MG/DL (ref 70–110)
GLUCOSE UR QL STRIP: NEGATIVE
HCT VFR BLD AUTO: 36.7 % (ref 37–48.5)
HGB BLD-MCNC: 12.5 G/DL (ref 12–16)
HGB UR QL STRIP: ABNORMAL
HYALINE CASTS UR QL AUTO: 5 /LPF
IMM GRANULOCYTES # BLD AUTO: 0.03 K/UL (ref 0–0.04)
IMM GRANULOCYTES NFR BLD AUTO: 0.3 % (ref 0–0.5)
KETONES UR QL STRIP: NEGATIVE
LEUKOCYTE ESTERASE UR QL STRIP: NEGATIVE
LIPASE SERPL-CCNC: 19 U/L (ref 4–60)
LYMPHOCYTES # BLD AUTO: 2.4 K/UL (ref 1–4.8)
LYMPHOCYTES NFR BLD: 21.4 % (ref 18–48)
MCH RBC QN AUTO: 28.3 PG (ref 27–31)
MCHC RBC AUTO-ENTMCNC: 34.1 G/DL (ref 32–36)
MCV RBC AUTO: 83 FL (ref 82–98)
MICROSCOPIC COMMENT: ABNORMAL
MONOCYTES # BLD AUTO: 0.6 K/UL (ref 0.3–1)
MONOCYTES NFR BLD: 5.6 % (ref 4–15)
NEUTROPHILS # BLD AUTO: 7.7 K/UL (ref 1.8–7.7)
NEUTROPHILS NFR BLD: 67.9 % (ref 38–73)
NITRITE UR QL STRIP: NEGATIVE
NRBC BLD-RTO: 0 /100 WBC
PH UR STRIP: 5 [PH] (ref 5–8)
PLATELET # BLD AUTO: 244 K/UL (ref 150–450)
PMV BLD AUTO: 9.6 FL (ref 9.2–12.9)
POTASSIUM SERPL-SCNC: 4.2 MMOL/L (ref 3.5–5.1)
PROT SERPL-MCNC: 8 G/DL (ref 6–8.4)
PROT UR QL STRIP: NEGATIVE
RBC # BLD AUTO: 4.41 M/UL (ref 4–5.4)
RBC #/AREA URNS AUTO: 3 /HPF (ref 0–4)
SODIUM SERPL-SCNC: 135 MMOL/L (ref 136–145)
SP GR UR STRIP: 1.02 (ref 1–1.03)
SQUAMOUS #/AREA URNS AUTO: 10 /HPF
URN SPEC COLLECT METH UR: ABNORMAL
WBC # BLD AUTO: 11.35 K/UL (ref 3.9–12.7)
WBC #/AREA URNS AUTO: 1 /HPF (ref 0–5)

## 2024-08-27 PROCEDURE — 25000003 PHARM REV CODE 250: Performed by: PHYSICIAN ASSISTANT

## 2024-08-27 PROCEDURE — 85025 COMPLETE CBC W/AUTO DIFF WBC: CPT | Performed by: NURSE PRACTITIONER

## 2024-08-27 PROCEDURE — 81025 URINE PREGNANCY TEST: CPT | Performed by: NURSE PRACTITIONER

## 2024-08-27 PROCEDURE — 81001 URINALYSIS AUTO W/SCOPE: CPT | Performed by: NURSE PRACTITIONER

## 2024-08-27 PROCEDURE — 96360 HYDRATION IV INFUSION INIT: CPT

## 2024-08-27 PROCEDURE — 83690 ASSAY OF LIPASE: CPT | Performed by: NURSE PRACTITIONER

## 2024-08-27 PROCEDURE — 25500020 PHARM REV CODE 255: Performed by: EMERGENCY MEDICINE

## 2024-08-27 PROCEDURE — 80053 COMPREHEN METABOLIC PANEL: CPT | Performed by: NURSE PRACTITIONER

## 2024-08-27 PROCEDURE — 99285 EMERGENCY DEPT VISIT HI MDM: CPT | Mod: 25

## 2024-08-27 RX ORDER — AMOXICILLIN AND CLAVULANATE POTASSIUM 875; 125 MG/1; MG/1
1 TABLET, FILM COATED ORAL
Status: COMPLETED | OUTPATIENT
Start: 2024-08-27 | End: 2024-08-27

## 2024-08-27 RX ORDER — AMOXICILLIN AND CLAVULANATE POTASSIUM 875; 125 MG/1; MG/1
1 TABLET, FILM COATED ORAL 2 TIMES DAILY
Qty: 14 TABLET | Refills: 0 | Status: SHIPPED | OUTPATIENT
Start: 2024-08-27

## 2024-08-27 RX ORDER — OXYCODONE HYDROCHLORIDE 5 MG/1
5 TABLET ORAL
Status: COMPLETED | OUTPATIENT
Start: 2024-08-27 | End: 2024-08-27

## 2024-08-27 RX ADMIN — AMOXICILLIN AND CLAVULANATE POTASSIUM 1 TABLET: 875; 125 TABLET, FILM COATED ORAL at 09:08

## 2024-08-27 RX ADMIN — OXYCODONE HYDROCHLORIDE 5 MG: 5 TABLET ORAL at 08:08

## 2024-08-27 RX ADMIN — SODIUM CHLORIDE 1000 ML: 9 INJECTION, SOLUTION INTRAVENOUS at 08:08

## 2024-08-27 RX ADMIN — IOHEXOL 75 ML: 350 INJECTION, SOLUTION INTRAVENOUS at 08:08

## 2024-08-27 NOTE — TELEPHONE ENCOUNTER
Patient c/o 10/10 pelvic pain. Advised per protocol to go to the ED now. Advised the patient to call back with any further questions or if symptoms worsen.        Reason for Disposition   [1] SEVERE pelvic pain AND [2] present > 1 hour    Additional Information   Negative: Shock suspected (e.g., cold/pale/clammy skin, too weak to stand, low BP, rapid pulse)   Negative: Passed out (e.g., fainted, lost consciousness, blacked out and was not responding)   Negative: Sounds like a life-threatening emergency to the triager   Negative: [1] SEVERE pelvic pain (e.g., excruciating) AND [2] vomiting    Protocols used: Pelvic Pain - Female-A-AH

## 2024-08-27 NOTE — FIRST PROVIDER EVALUATION
Emergency Department TeleTriage Encounter Note      CHIEF COMPLAINT    Chief Complaint   Patient presents with    Abdominal Pain     Lower abdominal pain       VITAL SIGNS   Initial Vitals [08/27/24 1746]   BP Pulse Resp Temp SpO2   117/77 77 20 98.7 °F (37.1 °C) 99 %      MAP       --            ALLERGIES    Review of patient's allergies indicates:  No Known Allergies    PROVIDER TRIAGE NOTE  This is a teletriage evaluation of a 48 y.o. female presenting to the ED complaining of lower abd pain since Friday. Denies other symptoms.    Alert, no distress.     Initial orders will be placed and care will be transferred to an alternate provider when patient is roomed for a full evaluation. Any additional orders and the final disposition will be determined by that provider.         ORDERS  Labs Reviewed   CBC W/ AUTO DIFFERENTIAL   COMPREHENSIVE METABOLIC PANEL   LIPASE   URINALYSIS, REFLEX TO URINE CULTURE   POCT URINE PREGNANCY   ISTAT CHEM8       ED Orders (720h ago, onward)      Start Ordered     Status Ordering Provider    08/27/24 1823 08/27/24 1822  Vital signs  Every 2 hours         Ordered RUPERT ERIC N.    08/27/24 1823 08/27/24 1822  Diet NPO  Diet effective now         Ordered RUPERT ERIC N.    08/27/24 1823 08/27/24 1822  Insert peripheral IV  Once         Ordered RUPERT ERIC N.    08/27/24 1823 08/27/24 1822  CBC W/ AUTO DIFFERENTIAL  STAT         Ordered RUPERT ERIC N.    08/27/24 1823 08/27/24 1822  Comp. Metabolic Panel  STAT         Ordered RUPERT ERIC N.    08/27/24 1823 08/27/24 1822  ISTAT CHEM8  Once         Ordered YU ERICIKA N.    08/27/24 1823 08/27/24 1822  Lipase  STAT         Ordered RUPERT ERIC N.    08/27/24 1823 08/27/24 1822  Urinalysis, Reflex to Urine Culture Urine, Clean Catch  STAT         Ordered RUPERT ERIC N.    08/27/24 1823 08/27/24 1822  POCT urine pregnancy  Once         Ordered  RUPERT ERIC              Virtual Visit Note: The provider triage portion of this emergency department evaluation and documentation was performed via [employer], a HIPAA-compliant telemedicine application, in concert with a tele-presenter in the room. A face to face patient evaluation with one of my colleagues will occur once the patient is placed in an emergency department room.      DISCLAIMER: This note was prepared with [email or web address] voice recognition transcription software. Garbled syntax, mangled pronouns, and other bizarre constructions may be attributed to that software system.

## 2024-08-28 ENCOUNTER — TELEPHONE (OUTPATIENT)
Dept: SURGERY | Facility: CLINIC | Age: 49
End: 2024-08-28
Payer: COMMERCIAL

## 2024-08-28 NOTE — TELEPHONE ENCOUNTER
Called patient to inform her about the upcoming appointment with Dr. Hensley on September 23 at 3:20 PM. Patient expressed understanding.

## 2024-08-28 NOTE — ED NOTES
Patient identifiers verified and correct for  MS Blackmon  C/C:  Mid umbilical abd paiN SEE NN  APPEARANCE: awake and alert in NAD. PAIN  9/10  SKIN: warm, dry and intact. No breakdown or bruising.  MUSCULOSKELETAL: Patient moving all extremities spontaneously, no obvious swelling or deformities noted. Ambulates independently.  RESPIRATORY: Denies shortness of breath.Respirations unlabored.   CARDIAC: Denies CP, 2+ distal pulses; no peripheral edema  ABDOMEN: ABdomen soft, al over mid umbilical abd pain   : voids spontaneously, denies difficulty  Neurologic: AAO x 4; follows commands equal strength in all extremities; denies numbness/tingling. Denies dizziness Denies new weakness

## 2024-08-28 NOTE — DISCHARGE INSTRUCTIONS
You were diagnosed with acute diverticulitis.  This will be treated with Augmentin.  Take as directed    Your CT shows signs of bowel wall thickening.  This may be sought seen in malignancy.  Please follow up with colorectal surgery for further evaluation.  You also need to follow up with colorectal surgery for acute diverticulitis as you will need a colonoscopy    Strict ED precautions given to return immediately for new, worsening, or concerning symptoms including vomiting, fever, or if you are not passing stool or gas

## 2024-08-28 NOTE — ED PROVIDER NOTES
Encounter Date: 8/27/2024       History     Chief Complaint   Patient presents with    Abdominal Pain     Lower abdominal pain     48-year-old female with no pertinent PMHx presents to emergency department with lower abdominal pain x5 days. She has associated decrease appetite and constipation. She had a normal BM yesterday. She is passing flatus. No prior episodes.  She denies fever, nausea, vomiting, diarrhea, bloody stool, dysuria, hematuria, flank pain, vaginal discharge, vaginal bleeding.    The history is provided by the patient.     Review of patient's allergies indicates:  No Known Allergies  Past Medical History:   Diagnosis Date    Allergy      Past Surgical History:   Procedure Laterality Date    BREAST BIOPSY       Family History   Problem Relation Name Age of Onset    Ovarian cancer Sister      Cancer Sister          uterine    Cancer Brother          skin     Social History     Tobacco Use    Smoking status: Never    Smokeless tobacco: Never   Substance Use Topics    Alcohol use: Never    Drug use: Never     Review of Systems   Constitutional:  Positive for appetite change. Negative for fever.   Gastrointestinal:  Positive for abdominal pain and constipation. Negative for blood in stool, diarrhea, nausea and vomiting.   Genitourinary:  Negative for dysuria, hematuria, vaginal bleeding and vaginal discharge.       Physical Exam     Initial Vitals [08/27/24 1746]   BP Pulse Resp Temp SpO2   117/77 77 20 98.7 °F (37.1 °C) 99 %      MAP       --         Physical Exam    Nursing note and vitals reviewed.  Constitutional: She appears well-developed and well-nourished. She is not diaphoretic. No distress.   HENT:   Head: Normocephalic and atraumatic.   Nose: Nose normal.   Eyes: Conjunctivae and EOM are normal.   Neck: Neck supple.   Cardiovascular:  Normal rate.           Pulmonary/Chest: No respiratory distress.   Abdominal: Abdomen is soft. She exhibits no distension. There is abdominal tenderness in the  suprapubic area and left lower quadrant.   No right CVA tenderness.  No left CVA tenderness. There is guarding.   Musculoskeletal:      Cervical back: Neck supple.     Neurological: She is alert and oriented to person, place, and time. Gait normal.   Skin: No rash noted.   Psychiatric: She has a normal mood and affect. Thought content normal.         ED Course   Procedures  Labs Reviewed   CBC W/ AUTO DIFFERENTIAL - Abnormal       Result Value    WBC 11.35      RBC 4.41      Hemoglobin 12.5      Hematocrit 36.7 (*)     MCV 83      MCH 28.3      MCHC 34.1      RDW 12.1      Platelets 244      MPV 9.6      Immature Granulocytes 0.3      Gran # (ANC) 7.7      Immature Grans (Abs) 0.03      Lymph # 2.4      Mono # 0.6      Eos # 0.5      Baso # 0.04      nRBC 0      Gran % 67.9      Lymph % 21.4      Mono % 5.6      Eosinophil % 4.4      Basophil % 0.4      Differential Method Automated     COMPREHENSIVE METABOLIC PANEL - Abnormal    Sodium 135 (*)     Potassium 4.2      Chloride 104      CO2 22 (*)     Glucose 112 (*)     BUN 13      Creatinine 0.9      Calcium 9.9      Total Protein 8.0      Albumin 3.8      Total Bilirubin 0.6      Alkaline Phosphatase 88      AST 36      ALT 31      eGFR >60.0      Anion Gap 9     URINALYSIS, REFLEX TO URINE CULTURE - Abnormal    Specimen UA Urine, Clean Catch      Color, UA Yellow      Appearance, UA Hazy (*)     pH, UA 5.0      Specific Gravity, UA 1.020      Protein, UA Negative      Glucose, UA Negative      Ketones, UA Negative      Bilirubin (UA) Negative      Occult Blood UA 1+ (*)     Nitrite, UA Negative      Leukocytes, UA Negative      Narrative:     Specimen Source->Urine   URINALYSIS MICROSCOPIC - Abnormal    RBC, UA 3      WBC, UA 1      Bacteria Rare      Squam Epithel, UA 10      Hyaline Casts, UA 5 (*)     Microscopic Comment SEE COMMENT      Narrative:     Specimen Source->Urine   LIPASE    Lipase 19     POCT URINE PREGNANCY    POC Preg Test, Ur Negative        Acceptable Yes            Imaging Results               CT Abdomen Pelvis With IV Contrast NO Oral Contrast (Final result)  Result time 08/27/24 21:37:58      Final result by Chung Aleman MD (08/27/24 21:37:58)                   Impression:      Findings of acute sigmoid diverticulitis.  No evidence of abscess formation. No free intraperitoneal air to indicate atul perforation.    Area of focal wall thickening and luminal narrowing of the colon at the hepatic flexure, as above.  Finding may reflect decompressed bowel, but colon neoplasm would have a similar appearance.  Gastroenterology consultation is recommended.    Fecalization of the distal ileum which may be seen in the setting of chronic low-grade mechanical obstruction. No evidence of high-grade bowel obstruction at this time.    This report was flagged in Epic as abnormal.    Electronically signed by resident: Mily Holguin  Date:    08/27/2024  Time:    20:45    Electronically signed by: Chung lAeman MD  Date:    08/27/2024  Time:    21:37               Narrative:    EXAMINATION:  CT ABDOMEN PELVIS WITH IV CONTRAST    CLINICAL HISTORY:  LLQ abdominal pain;    TECHNIQUE:  Axial images of the abdomen and pelvis were acquired after the use of 75 cc Fpal213 IV contrast.  Coronal and sagittal reconstructions were also obtained    COMPARISON:  None    FINDINGS:  Thoracic soft tissues: Partially visualized thoracic soft tissues appear unremarkable.    Heart: No pericardial effusion.    Lungs: Mild ground-glass attenuation at the lung bases, possibly related to respiratory motion artifact or atelectasis.  No pleural effusion.    Esophagus: Unremarkable.    Stomach and duodenum: Unremarkable.    Liver: Normal in size and contour.  No focal hepatic lesion.    Gallbladder: No calcified gallstones.    Bile Ducts: No evidence of dilated ducts.    Spleen: Unremarkable.    Pancreas: No mass or peripancreatic fat stranding.    Adrenals:  Unremarkable.    Kidneys/Ureters: Normal in size and location. Small hypodensity at the inferior pole of the left kidney which is too small to characterize.  No hydronephrosis or nephrolithiasis. No ureteral dilatation.    Bladder: No evidence of wall thickening.    Reproductive organs: Unremarkable.    Bowel/Mesentery: Sigmoid colon diverticulosis (2-146) with inflammatory changes in the mesocolon.  No evidence of abscess formation.  No free intraperitoneal air to indicate atul perforation.    Focal wall thickening (up to 1 cm in thickness) and luminal narrowing (2-47) of the large bowel at the hepatic flexure, which is nonspecific, but is concerning for colon neoplasm.  There is fecalization of the terminal ileum which may be seen in the setting of chronic low-grade mechanical obstruction (possibly secondary to upstream obstruction).  No evidence of high-grade bowel obstruction at this time.  The appendix is normal.    Peritoneum: No intraperitoneal free air or fluid.    Lymph nodes: No lymphadenopathy.    Abdominal wall:  Unremarkable.    Vasculature: No aneurysm. No significant calcific atherosclerosis.    Bones: No acute fracture. No suspicious osseous lesions.                                       Medications   oxyCODONE immediate release tablet 5 mg (5 mg Oral Given 8/27/24 2057)   sodium chloride 0.9% bolus 1,000 mL 1,000 mL (0 mLs Intravenous Stopped 8/27/24 2137)   iohexoL (OMNIPAQUE 350) injection 75 mL (75 mLs Intravenous Given 8/27/24 2015)   amoxicillin-clavulanate 875-125mg per tablet 1 tablet (1 tablet Oral Given 8/27/24 2156)     Medical Decision Making  48-year-old female with no pertinent PMHx presents to emergency department with lower abdominal pain x5 days. Nontoxic appearing. Hemodynamically stable. Afebrile. Exam as above. I will initiate abdominal pain workup, give fluids and pain medication and reassess    Ddx:  Diverticulitis, colitis, mass, constipation, UTI, obstructive uropathy    -  "Labs without leukocytosis. H&H stable.   - UA negative for nitrites or leukocytes.  1+ blood noted  - CT AP with signs of acute sigmoid diverticulitis. There are no signs of fluid collection concerning for abscess or free air concerning for perforation.  Fecalization of the distal ileum was noted concerning for low-grade mechanical obstruction.  She was able to eat and drink today. She denies vomiting. She had a normal BM last night and is passing flatus today. On exam, her abdomen is soft and non distended. I have low suspicion at this time for mechanical obstruction. Strict ED precautions given to return immediately for new, worsening, or concerning symptoms including worsening of her abdominal pain, vomiting, inability to pass stool or flatus, fever  - I will treat acute diverticulitis with Augmentin.  First dose given  - On CT an "area of focal wall thickening and luminal narrowing of the colon at the hepatic flexure, as above.  Finding may reflect decompressed bowel, but colon neoplasm would have a similar appearance." I discussed this finding in depth with patient and her .  Placed a referral with colon and Rectal surgery for follow up for this finding and acute diverticulitis  - On reassessment patient is resting comfortably. She feels better.  She is hemodynamically stable.  I will discharge at this time             Amount and/or Complexity of Data Reviewed  Labs:  Decision-making details documented in ED Course.  Radiology: ordered.    Risk  Prescription drug management.               ED Course as of 08/28/24 1205   Tue Aug 27, 2024   2014 WBC: 11.35 [HM]   2014 Hemoglobin: 12.5 [HM]   2014 Hematocrit(!): 36.7 [HM]   2014 Sodium(!): 135 [HM]   2014 Potassium: 4.2 [HM]   2014 BUN: 13 [HM]   2014 Creatinine: 0.9 [HM]   2014 ALP: 88 [HM]   2014 AST: 36 [HM]   2014 ALT: 31 [HM]   2014 Lipase: 19 [HM]   2014 BILIRUBIN TOTAL: 0.6 [HM]   2014 Blood, UA(!): 1+ [HM]   2015 NITRITE UA: Negative []   2015 " Leukocyte Esterase, UA: Negative [HM]      ED Course User Index  [HM] Ingrid Butcher PA-C                           Clinical Impression:  Final diagnoses:  [R10.9] Abdominal pain, unspecified abdominal location (Primary)  [K57.92] Acute diverticulitis  [R93.3] Abnormal CT scan, colon          ED Disposition Condition    Discharge Stable          ED Prescriptions       Medication Sig Dispense Start Date End Date Auth. Provider    amoxicillin-clavulanate 875-125mg (AUGMENTIN) 875-125 mg per tablet Take 1 tablet by mouth 2 (two) times daily. 14 tablet 8/27/2024 -- Ingrid Butcher PA-C          Follow-up Information       Follow up With Specialties Details Why Contact Info Additional Information    [facility] [address] Rectal [facility] Schedule an appointment as soon as possible for a visit   [address] [facility] [address] [phone] [facility] [facility] Please park in [facility] and use [facility] elevator    [facility] Emergency Medicine Schedule an appointment as soon as possible for a visit   [address] [facility] [address] [phone]              Ingrid Butcher PA-C  08/28/24 1206

## 2024-11-27 ENCOUNTER — TELEPHONE (OUTPATIENT)
Dept: SURGERY | Facility: CLINIC | Age: 49
End: 2024-11-27
Payer: COMMERCIAL

## 2024-12-05 ENCOUNTER — TELEPHONE (OUTPATIENT)
Dept: SURGERY | Facility: CLINIC | Age: 49
End: 2024-12-05
Payer: COMMERCIAL

## 2024-12-05 NOTE — TELEPHONE ENCOUNTER
Spoke with patient, appt confirmed, location reviewed. Sounded like phone disconnected, attempted to return call but went it went to voicemail.

## 2025-03-22 ENCOUNTER — HOSPITAL ENCOUNTER (EMERGENCY)
Facility: HOSPITAL | Age: 50
Discharge: HOME | End: 2025-03-22
Attending: STUDENT IN AN ORGANIZED HEALTH CARE EDUCATION/TRAINING PROGRAM
Payer: COMMERCIAL

## 2025-03-22 VITALS
WEIGHT: 141.75 LBS | HEART RATE: 97 BPM | OXYGEN SATURATION: 98 % | BODY MASS INDEX: 26.76 KG/M2 | SYSTOLIC BLOOD PRESSURE: 101 MMHG | TEMPERATURE: 99 F | HEIGHT: 61 IN | DIASTOLIC BLOOD PRESSURE: 64 MMHG | RESPIRATION RATE: 18 BRPM

## 2025-03-22 DIAGNOSIS — R05.9 COUGH: ICD-10-CM

## 2025-03-22 DIAGNOSIS — R00.0 TACHYCARDIA: ICD-10-CM

## 2025-03-22 DIAGNOSIS — J01.10 ACUTE FRONTAL SINUSITIS, RECURRENCE NOT SPECIFIED: Primary | ICD-10-CM

## 2025-03-22 LAB
B-HCG UR QL: NEGATIVE
CTP QC/QA: YES
INFLUENZA A BY PCR (OHS): POSITIVE
INFLUENZA B BY PCR (OHS): NEGATIVE
OHS QRS DURATION: 84 MS
OHS QTC CALCULATION: 403 MS
RSV A 5' UTR RNA NPH QL NAA+PROBE: NEGATIVE
SARS-COV-2 RNA RESP QL NAA+PROBE: NEGATIVE

## 2025-03-22 PROCEDURE — 99284 EMERGENCY DEPT VISIT MOD MDM: CPT | Mod: 25

## 2025-03-22 PROCEDURE — 93010 ELECTROCARDIOGRAM REPORT: CPT | Mod: ,,, | Performed by: INTERNAL MEDICINE

## 2025-03-22 PROCEDURE — 81025 URINE PREGNANCY TEST: CPT | Performed by: STUDENT IN AN ORGANIZED HEALTH CARE EDUCATION/TRAINING PROGRAM

## 2025-03-22 PROCEDURE — 0241U SARS-COV2 (COVID) WITH FLU/RSV BY PCR: CPT | Performed by: STUDENT IN AN ORGANIZED HEALTH CARE EDUCATION/TRAINING PROGRAM

## 2025-03-22 PROCEDURE — 25000003 PHARM REV CODE 250: Performed by: STUDENT IN AN ORGANIZED HEALTH CARE EDUCATION/TRAINING PROGRAM

## 2025-03-22 PROCEDURE — 93005 ELECTROCARDIOGRAM TRACING: CPT

## 2025-03-22 RX ORDER — IBUPROFEN 600 MG/1
600 TABLET ORAL
Status: COMPLETED | OUTPATIENT
Start: 2025-03-22 | End: 2025-03-22

## 2025-03-22 RX ORDER — ACETAMINOPHEN 325 MG/1
650 TABLET ORAL
Status: COMPLETED | OUTPATIENT
Start: 2025-03-22 | End: 2025-03-22

## 2025-03-22 RX ORDER — ONDANSETRON 4 MG/1
4 TABLET, ORALLY DISINTEGRATING ORAL
Status: COMPLETED | OUTPATIENT
Start: 2025-03-22 | End: 2025-03-22

## 2025-03-22 RX ORDER — PSEUDOEPHEDRINE HCL 30 MG
60 TABLET ORAL
Status: COMPLETED | OUTPATIENT
Start: 2025-03-22 | End: 2025-03-22

## 2025-03-22 RX ADMIN — IBUPROFEN 600 MG: 600 TABLET, FILM COATED ORAL at 04:03

## 2025-03-22 RX ADMIN — ACETAMINOPHEN 650 MG: 325 TABLET ORAL at 04:03

## 2025-03-22 RX ADMIN — ONDANSETRON 4 MG: 4 TABLET, ORALLY DISINTEGRATING ORAL at 04:03

## 2025-03-22 RX ADMIN — PSEUDOEPHEDRINE HCL 60 MG: 30 TABLET, FILM COATED ORAL at 04:03

## 2025-03-22 NOTE — Clinical Note
"Rajendra Stringer" Lorri was seen and treated in our emergency department on 3/22/2025.  She may return to work on 03/24/2025.       If you have any questions or concerns, please don't hesitate to call.      Frnaco Arguello, DO"

## 2025-03-22 NOTE — ED TRIAGE NOTES
Rajendra Blackmon, a 49 y.o. female presents to the ED w/ complaint of cough, chills, headache, and body aches since Wednesday. Pt endorses runny nose and congestion. Pt denies chest pain and SOB. Pt is AAOx4, GCS 15.         Review of patient's allergies indicates:  No Known Allergies  Past Medical History:   Diagnosis Date    Allergy

## 2025-03-22 NOTE — ED PROVIDER NOTES
[facility]  Phone: [phone]   Fax: [phone]    Physical Therapy Treatment Note/ Progress Report:     Date:  2022    Patient Name:  Yobany Stanley    :  1958  MRN: [id number]     Restrictions/Precautions:  None  Noted    Medical/Treatment Diagnosis Information:  · Diagnosis: G89.4 (ICD-10-CM) - Chronic pain syndrome  · Treatment Diagnosis: dec R shoulder AROM and strength, mild R GHJ hypomobility, impaired posture  ·   Insurance/Certification information: [facility]   Physician Information:   Margot Palomo MD  Plan of care signed (Y/N): []  Yes [x]  No     Date of Patient follow up with Physician: 22     Progress Report: []  Yes  [x]  No     Date Range for reporting period:  Beginnin22  Ending:     Progress report due (10 Rx/or 30 days whichever is less): visit #10 or  (date)     Recertification due (POC duration/ or 90 days whichever is less): visit #12 or  (date)     Visit # Insurance Allowable Auth required? Date Range   eval +  30 v/yr  96 units this episode [x]  Yes - [facility]  []  No 22 - 22     Relevant Medical History:   Arthritis  Bipolar 1 disorder  Degenerative disc disease, lumbar  Diabetes mellitus type 2,    Essential hypertension  Mixed hyperlipidemia 2020  Pure hyperglyceridemia     FINDINGS:   Alignment at the [facility] joint and glenohumeral joint appears normal.  Mild   spurring is seen at the [facility] joint.  Mild spurring seen at the glenohumeral   joint.  No focal lung consolidation noted. Latex Allergy:  [x]NO      []YES  Preferred Language for Healthcare:   [x]English       []other:    Functional Scale:             FOTO physical FS primary measure score = 38; risk adjusted = 45  22     Pain level:  7-8/10      SUBJECTIVE: Patient had a fall back in 2022, falling bwd onto R elbow and shoulder. Pt reports x ray imaging showed spurs and degenerative changes in R shoulder. Source of History  patient, family, and EMR    Chief Complaint    Flu like concerns (Pt has c/o cough, subjective fever, headache, body aches since tues. Denies N/V. )      History of Present Illness    Rajendra Blackmon is a 49 y.o. female presenting with frontal headache, subjective fever (did not take temperature or take ibuprofen or Tylenol at home), cough, body aches for about 3 days.  Mostly in the sinuses, states she has seasonal allergies and takes daily antihistamine and nasal spray.  No vision changes, numbness or tingling, difficulty ambulating.  No known sick contacts.    Review of Systems    As per HPI and below:  Constitutional symptoms:  Subjective fevers and chills  Skin symptoms:  No rash    Eye symptoms:  No blurred vision  ENMT symptoms:  Very mild sore throat, positive nasal congestion and rhinorrhea, positive sinus pain and pressure, no otorrhea or otalgia  Respiratory symptoms:  Positive cough without shortness of breath wheezing or dyspnea with exertion  Cardiovascular symptoms:  No chest pain  Gastrointestinal symptoms:  Mild epigastric discomfort and occasional nausea but no vomiting, no diarrhea  Musculoskeletal symptoms:   positive myalgias  Neurologic symptoms:  Frontal headache and on the side of the patient's face      Past History    As per HPI and below:  Past Medical History:   Diagnosis Date    Allergy        Past Surgical History:   Procedure Laterality Date    BREAST BIOPSY         Social History     Socioeconomic History    Marital status:    Tobacco Use    Smoking status: Never    Smokeless tobacco: Never   Substance and Sexual Activity    Alcohol use: Never    Drug use: Never    Sexual activity: Yes     Partners: Male     Birth control/protection: Implant       Family History   Problem Relation Name Age of Onset    Ovarian cancer Sister      Cancer Sister          uterine    Cancer Brother          skin       Review of patient's allergies indicates:  No Known  "Allergies    Medications Ordered Prior to Encounter[1]    Physical Exam    Reviewed nursing notes.  Vitals:    03/22/25 0341 03/22/25 0431 03/22/25 0530   BP: 123/76  101/64   Patient Position:   Lying   Pulse: (!) 114  97   Resp: 18  18   Temp: 99.9 °F (37.7 °C) 99.9 °F (37.7 °C) 99 °F (37.2 °C)   TempSrc: Oral  Oral   SpO2: 97%  98%   Weight: 64.3 kg (141 lb 12.1 oz)     Height: 5' 1" (1.549 m)       General:  Alert, no acute distress.    Skin:  Warm, dry, intact.  No rash.  Head:  Normocephalic, atraumatic.    Neck:  Supple.   Eye:  extraocular movements are intact.    Ears, nose, mouth and throat:  Normal phonation.  Boggy nasal turbinates bilaterally, with clear rhinorrhea and tenderness over the face.  Oropharynx is clear.  Tolerating secretions.  Cardiovascular:  No edema.  Regular pulses.    Respiratory:  Respirations are non-labored.  Clear bilaterally.  Gastrointestinal:  Nondistended.  No belching or vomiting.  Back: Normal gait.  Ambulatory.  Musculoskeletal:  Normal range of motion observed.  Neurological:  Alert and oriented to person, place, time, and situation.  No focal deficits observed.   Psychiatric:  Cooperative, appropriate mood & affect.       Initial MDM and Data Review    49 y.o. female presenting for evaluation of sinus headache and pressure with fever/chills and nasal congestion     Differential includes but is not limited to: most consistent with viral URI and sinus infection, likely viral given 3 days length  Pna, covid/flu or other viral illness  Allergic sinusitis  Doubt significant neurologic pathology given no neuro sx    Work-up includes: viral panel, CXR, upreg    Interventions include: antipyretic/analgesia, sudafed, zofran for slight nausea    The patient has significant medical comorbidities that influence decision making for this acute process, such as: none    I decided to obtain the patient's medical records and review relevant documentation from hospital records.  Pertinent " Pain is located in posterior R shoulder (pt reaches to R infraspinatus mm belly). Pt will get N/T here as well. When pain increases, pain feels like he will get an ice pick stabbing sensation. Pt has been using heat on shoulder and this feels good. Heating pad to wrap around his shoulder. Pain does radiate into right side of neck. Pt is aware of his posture and diapragmatic breathing and believes this helps manage his pain. Pain while driving, turning steering wheel, and he doesn't know where to position R UE. Pain with writing, to some extent. Pt has a lap table to help shoulder while writing. Pt has been doing aquatic exercises, though for arm exercises, performs these gently. 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 6/16   Pt reports shoulder is more irritated this date than normal. Would like \"heat treatment. \"   6/21 - shoulder and UT remain sore.        PROM AROM     L R L R   Shoulder Flexion      170 80*   Shoulder Abduction        80* (worse than flexion)   Shoulder External Rotation      T3 CT jcn   Shoulder Internal Rotation      TL jcn R PSIS*   Elbow Flexion        WFL   Elbow Extension      5 deg from neutral 5 deg from neutral         Strength (0-5) Left Right    Shoulder Shrug (C4)       Shoulder Flex   2+*   Shoulder Abd (C5)   2+*   Shoulder ER   4*   Shoulder IR   4* Worse than ER   Biceps (C6)   4*   Triceps (C7)   4* Worse than biceps         Therapeutic Exercise (15105)  30  Notes/Cues   NT 2 min 0#    UBE add    Pulleys 2 min         Tband      Row      Red  2 x 10    extension Red  2 x 10    IR add    ER add         Supine     flexion     serratus          sidelying     ER     Abd          Therapeutic Activities (85734)                    Neuromuscular Re-ed (36934)     Supine ABC's     Ball on wall: up/down, L/R, CW, CCW                    Manual Intervention (47169)    7 min          PROM/joint mobs add    stm GH joint/UT 8 min Modalities             8 min       US 1.5 w/cm2 100% RIGHT gh JOINT/UT x 8 min         MHP   15 min post Cervical pack post ex  Seated                                 Pt. Education:  -pt educated on diagnosis, prognosis and expectations for rehab  -all pt questions were answered    Home Exercise Program:  Access Code: [email or web address]  URL: [email or web address]  Date: 06/07/2022  Prepared by: Ethan Witt    Exercises  Isometric Shoulder Flexion at Wall - 1 x daily - 7 x weekly - 1 sets - 10 reps - 5 hold  Isometric Shoulder Abduction at Wall - 1 x daily - 7 x weekly - 1 sets - 10 reps - 5 hold  Isometric Shoulder Adduction - 1 x daily - 7 x weekly - 1 sets - 10 reps - 5 hold  Seated Shoulder Abduction Towel Slide at Table Top - 2 x daily - 7 x weekly - 1 sets - 20 reps  Seated Shoulder Flexion Towel Slide at Table Top - 2 x daily - 7 x weekly - 1 sets - 20 reps  Supine Shoulder Flexion AAROM with Dowel - 1 x daily - 7 x weekly - 2 sets - 10 reps      Therapeutic Exercise and NMR EXR  [x] (09863) Provided verbal/tactile cueing for activities related to strengthening, flexibility, endurance, ROM  for improvements in scapular, scapulothoracic and UE control with self care, reaching, carrying, lifting, house/yardwork, driving/computer work. [x] (36982) Provided verbal/tactile cueing for activities related to improving balance, coordination, kinesthetic sense, posture, motor skill, proprioception  to assist with  scapular, scapulothoracic and UE control with self care, reaching, carrying, lifting, house/yardwork, driving/computer work. [x] (24405) Therapist is in constant attendance of 2 or more patients providing skilled therapy interventions, but not providing any significant amount of measurable one-on-one time to either patient, for improvements in cervical, scapular, scapulothoracic and UE control with self care, reaching, carrying, lifting, house/yardwork, driving, computer work.      Therapeutic Activities: information is noted.      Medications   ibuprofen tablet 600 mg (600 mg Oral Given 3/22/25 0431)   acetaminophen tablet 650 mg (650 mg Oral Given 3/22/25 0431)   ondansetron disintegrating tablet 4 mg (4 mg Oral Given 3/22/25 0431)   pseudoephedrine tablet 60 mg (60 mg Oral Given 3/22/25 0431)       Results and ED Course    Labs Reviewed   SARS-COV2 (COVID) WITH FLU/RSV BY PCR   POCT URINE PREGNANCY       Result Value    POC Preg Test, Ur Negative       Acceptable Yes         Imaging Results              X-Ray Chest PA And Lateral (Final result)  Result time 03/22/25 08:02:09      Final result by Radames Rocha MD (03/22/25 08:02:09)                   Impression:      No acute chest disease identified.      Electronically signed by: Radames Rocha MD  Date:    03/22/2025  Time:    08:02               Narrative:    EXAMINATION:  XR CHEST PA AND LATERAL    CLINICAL HISTORY:  Cough, unspecified    TECHNIQUE:  PA and lateral views of the chest were performed.    COMPARISON:  08/18/2023.    FINDINGS:  The cardiac silhouette and superior mediastinal structures are unremarkable. Pulmonary vasculature is within normal limits. The lungs are well aerated and free of focal consolidations. There is no evidence for pneumothorax or pleural effusions. Bony structures are grossly intact.                                      ED Course as of 03/22/25 1423   Sat Mar 22, 2025   0425 hCG Qualitative, Urine: Negative [AC]   0458 X-Ray Chest PA And Lateral  No consolidations or opacities [AC]   0458 Reassess vitals, then discharged home [AC]   0552 Patient feeling a little bit better.  She was given acetaminophen, ibuprofen, Sudafed, and Zofran.  No vomiting here.  Improved vital signs.  We discussed symptomatic and supportive care at home for likely viral sinus infection.  She has a portal and she can follow up her COVID flu RSV test.  She will follow up with the primary care doctor in 4 days if her symptoms persist,  [x] (09290 or 79104) Provided verbal/tactile cueing for activities related to improving balance, coordination, kinesthetic sense, posture, motor skill, proprioception and motor activation to allow for proper function of scapular, scapulothoracic and UE control with self care, carrying, lifting, driving/computer work.      Home Exercise Program:    [x] (42869) Reviewed/Progressed HEP activities related to strengthening, flexibility, endurance, ROM of scapular, scapulothoracic and UE control with self care, reaching, carrying, lifting, house/yardwork, driving/computer work  [] (65037) Reviewed/Progressed HEP activities related to improving balance, coordination, kinesthetic sense, posture, motor skill, proprioception of scapular, scapulothoracic and UE control with self care, reaching, carrying, lifting, house/yardwork, driving/computer work      Manual Treatments:  PROM / STM / Oscillations-Mobs:  G-I, II, III, IV (PA's, Inf., Post.)  [x] (34034) Provided manual therapy to mobilize soft tissue/joints of cervical/CT, scapular GHJ and UE for the purpose of modulating pain, promoting relaxation,  increasing ROM, reducing/eliminating soft tissue swelling/inflammation/restriction, improving soft tissue extensibility and allowing for proper ROM for normal function with self care, reaching, carrying, lifting, house/yardwork, driving/computer work      Charges:  Timed Code Treatment Minutes: 45   Total Treatment Minutes: 45     Units approved Units used Date Range   96 3 6/16       [] EVAL - LOW (89077)   [] EVAL - MOD (04258)  [] EVAL - HIGH (63358)  [] RE-EVAL (62996)  [x] WE(58957) x 2      [] Ionto  [] NMR (67488) x       [] Vaso  [x] Manual (85867) x  1     [] Ultrasound  [] TA x        [] Mech Traction (42927)  [] Aquatic Therapy x                      ES (un) (86480):   [] Home Management Training x  [] ES(attended) (97474)   [] Dry Needling 1-2 muscles (17590):  [] Dry Needling 3+ muscles (467693  [] Group:      [] and we discussed the need for palpable antibiotics at that point.  No need for them now. [AC]   0535 She understands return precautions including worsening headache, vision changes, persistent vomiting and follow up instructions. [AC]      ED Course User Index  [AC] Franco Arguello,                EKG interpreted by myself    EKG  Performed: 03/22/2025   Rate/Rhythm/Axis:  113 bpm, sinus rhythm, right axis  QRS 84 ms  Qtc 403 ms  Impression:  Sinus tachycardia.  No acute ischemia.  Normal intervals.      Relevant imaging interpreted by myself      Impression and Plan    49 y.o. female with findings of viral sinusitis/URI based on the work up in the emergency department as above.    Improving after treatment in ED  CXR without pna  Vitals improved    Recommended: apap/ibu, nasal saline sprays, zyrtec (takes daily), sudafed    All tests, treatment options and disposition options were discussed with the patient.  The decision was made to discharge the patient to home.    The patient was discharged in stable condition and all further questions/concerns by patient and/or family were addressed.    The patient will follow up with their primary care physician as discussed in the next several days or return if any further concerns or change in symptoms necessitating re-evaluation.           Final diagnoses:  [R00.0] Tachycardia  [R05.9] Cough  [J01.10] Acute frontal sinusitis, recurrence not specified (Primary)        ED Disposition Condition    Discharge Stable          ED Prescriptions    None       Follow-up Information       Follow up With Specialties Details Why Contact Info        Please follow up with the primary care physician                 [1]   No current facility-administered medications on file prior to encounter.     Current Outpatient Medications on File Prior to Encounter   Medication Sig Dispense Refill    amoxicillin-clavulanate 875-125mg (AUGMENTIN) 875-125 mg per tablet Take 1 tablet by mouth 2 (two)  Other:                    GOALS:  Patient stated goal: be able to drive with less pain  []? Progressing: []? Met: []? Not Met: []? Adjusted     Therapist goals for Patient:   Short Term Goals: To be achieved in: 2 weeks  1. Independent in HEP and progression per patient tolerance, in order to prevent re-injury. []? Progressing: []? Met: []? Not Met: []? Adjusted  2. Patient will have a decrease in pain to facilitate improvement in movement, function, and ADLs as indicated by Functional Deficits. []? Progressing: []? Met: []? Not Met: []? Adjusted     Long Term Goals: To be achieved in: 6 weeks  1. FOTO score of at least 58 to assist with reaching prior level of function. []? Progressing: []? Met: []? Not Met: []? Adjusted  2. Patient will demonstrate increased AROM to 140 deg flexion, functional ER to T3, and functional IR to TL jcn to allow for proper joint functioning as indicated by patients Functional Deficits. []? Progressing: []? Met: []? Not Met: []? Adjusted  3. Patient will demonstrate an increase in Strength to 4/5 to allow for proper functional mobility as indicated by patients Functional Deficits. []? Progressing: []? Met: []? Not Met: []? Adjusted  4. Patient will return to functional activities including driving, care giving  without increased symptoms or restriction. []? Progressing: []? Met: []? Not Met: []? Adjusted    Overall Progression Towards Functional goals/ Treatment Progress Update:  [] Patient is progressing as expected towards functional goals listed. [] Progression is slowed due to complexities/Impairments listed. [] Progression has been slowed due to co-morbidities.   [x] Plan just implemented, too soon to assess goals progression <30days   [] Goals require adjustment due to lack of progress  [] Patient is not progressing as expected and requires additional follow up with physician  [] Other    Persisting Functional times daily. 14 tablet 0    busPIRone (BUSPAR) 5 MG Tab Take 1 tablet (5 mg total) by mouth 2 (two) times daily. 60 tablet 0    cetirizine (ZYRTEC) 10 MG tablet Take 10 mg by mouth.      cyclobenzaprine (FLEXERIL) 10 MG tablet       HYDROcodone-acetaminophen (NORCO) 5-325 mg per tablet       ibuprofen (ADVIL,MOTRIN) 800 MG tablet       norelgestromin-ethinyl estradiol (ORTHO EVRA) 150-35 mcg/24 hr Place 1 patch onto the skin every 7 days. Apply 1 patch per week for 3 weeks, then none for 1 week. (Patient not taking: Reported on 10/30/2023) 3 patch 12    nystatin (MYCOSTATIN) powder Apply topically 4 (four) times daily. (Patient not taking: Reported on 10/30/2023) 30 g 0        Franco Arguello, DO  03/22/25 1426     Limitations/Impairments:  []Sitting []Standing   [x]Transfers  [x]Sleeping   [x]Reaching [x]Lifting   [x]ADLs [x]Housework  [x]Driving []Job related tasks  []Sports/Recreation []Other:    ASSESSMENT:  Pt transferring to [facility] office after today's visit. Pt had one more visit scheduled in  but has decided to save approved units for QC office. Cueing given for form throughout session, lilo with TB exercises to avoid UT compensation. Pt reported slight increase in pain following the session and again mentioned that he thinks having a hot pack will help. Treatment/Activity Tolerance:  [x] Pt able to complete treatment [] Patient limited by fatique  [] Patient limited by pain  [] Patient limited by other medical complications  [] Other:     Prognosis:  [] Good [x] Fair  [] Poor    Patient Requires Follow-up: [x] Yes  [] No      PLAN: See [facility] PT 2x / week for 6 weeks. [x] Continue per plan of care [] Alter current plan (see comments)  [] Plan of care initiated [] Hold pending MD visit [] Discharge    Electronically signed by: MORENO Graham, DPT      Note: If patient does not return for scheduled/ recommended follow up visits, this note will serve as a discharge from care along with most recent update on progress.

## 2025-03-22 NOTE — DISCHARGE INSTRUCTIONS
You were evaluated in the emergency department today for sinus pain and pressure with upper respiratory symptoms.  Although there were no findings of concern to necessitate admission to the hospital or warrant immediate surgical intervention, disease exists on a spectrum and your disease process may progress.  If this is the case, please watch your symptoms and return to the emergency department if you feel worse and are unable to discuss care with your primary care doctor in follow up in the next several days.  Specific information regarding your complaint has been provided.  Thank you for choosing [facility]!    Make sure that you get plenty of rest, and lots of fluids to drink.  You may use Tylenol or ibuprofen to relieve fevers over 100.4F as well as aches and pains. You may alternate doses of ibuprofen and Tylenol to get relief. Be sure that you take medicine as directed, if you have any questions, please call your pharmacist or primary care physician. If you do not begin to feel better in 1-2 days, please return to the ED or see your primary care physician promptly. If you have high fevers over 104F and seem unusually drowsy or lethargic, return to the ED right away.     You can also continue to take your Zyrtec, and nasal sprays as this will help your symptoms.  You may also elect to use saline nasal sprays which can help irrigate the sinus passages.    If you continue to have symptoms for a total of 7 days, you need to follow up with your primary care doctor as antibiotics may be warranted at that time.  Otherwise, more than likely, these type of symptoms are viral.  Return to the emergency department if that persistent fevers not able to be treated by acetaminophen and ibuprofen at home, worsening body aches and pains, or any difficulty with tolerating medication, drink or food by mouth.